# Patient Record
Sex: FEMALE | Race: OTHER | Employment: PART TIME | URBAN - METROPOLITAN AREA
[De-identification: names, ages, dates, MRNs, and addresses within clinical notes are randomized per-mention and may not be internally consistent; named-entity substitution may affect disease eponyms.]

---

## 2017-02-27 ENCOUNTER — HOSPITAL ENCOUNTER (OUTPATIENT)
Dept: MAMMOGRAPHY | Age: 42
Discharge: HOME | End: 2017-02-27
Attending: OBSTETRICS & GYNECOLOGY
Payer: MEDICAID

## 2017-02-27 DIAGNOSIS — Z12.31 VISIT FOR SCREENING MAMMOGRAM: ICD-10-CM

## 2017-02-27 PROCEDURE — 77067 SCR MAMMO BI INCL CAD: CPT

## 2017-10-04 ENCOUNTER — HOSPITAL ENCOUNTER (OUTPATIENT)
Age: 42
Discharge: HOME | End: 2017-10-04
Attending: EMERGENCY MEDICINE
Payer: MEDICAID

## 2017-10-04 VITALS
DIASTOLIC BLOOD PRESSURE: 82 MMHG | SYSTOLIC BLOOD PRESSURE: 113 MMHG | RESPIRATION RATE: 18 BRPM | BODY MASS INDEX: 24 KG/M2 | HEART RATE: 101 BPM | OXYGEN SATURATION: 100 % | WEIGHT: 134 LBS | TEMPERATURE: 98 F

## 2017-10-04 DIAGNOSIS — H65.92 LEFT OTITIS MEDIA WITH EFFUSION: Primary | ICD-10-CM

## 2017-10-04 PROCEDURE — 99213 OFFICE O/P EST LOW 20 MIN: CPT

## 2017-10-04 PROCEDURE — 87430 STREP A AG IA: CPT

## 2017-10-04 PROCEDURE — 99214 OFFICE O/P EST MOD 30 MIN: CPT

## 2017-10-04 RX ORDER — AMOXICILLIN 875 MG/1
875 TABLET, COATED ORAL 2 TIMES DAILY
Qty: 20 TABLET | Refills: 0 | Status: SHIPPED | OUTPATIENT
Start: 2017-10-04 | End: 2017-10-14

## 2017-10-04 NOTE — ED PROVIDER NOTES
Patient Seen in: [facility] AND CLINICS Immediate Care In [address] [place] [address]    History   Patient presents with:  Cough/URI    Stated Complaint: sorethroat/ear pain    HPI    Patient is a 15-year-old female with no significant past medical history presents now with the posterior pharyngeal erythema without exudate. The uvula is midline.   There is mild diffuse sinus tenderness to palpation bilaterally  Chest: Clear to auscultation, no tenderness  Cardiovascular: Regular rate and rhythm without murmur  Abdomen: Soft, nont

## 2017-12-24 ENCOUNTER — HOSPITAL ENCOUNTER (OUTPATIENT)
Age: 42
Discharge: HOME | End: 2017-12-24
Attending: EMERGENCY MEDICINE
Payer: MEDICAID

## 2017-12-24 VITALS
RESPIRATION RATE: 16 BRPM | DIASTOLIC BLOOD PRESSURE: 70 MMHG | OXYGEN SATURATION: 100 % | WEIGHT: 139 LBS | TEMPERATURE: 100 F | SYSTOLIC BLOOD PRESSURE: 119 MMHG | HEART RATE: 119 BPM | BODY MASS INDEX: 25 KG/M2

## 2017-12-24 DIAGNOSIS — J11.1 INFLUENZA-LIKE ILLNESS: Primary | ICD-10-CM

## 2017-12-24 PROCEDURE — 99213 OFFICE O/P EST LOW 20 MIN: CPT

## 2017-12-24 PROCEDURE — 87430 STREP A AG IA: CPT

## 2017-12-24 PROCEDURE — 99214 OFFICE O/P EST MOD 30 MIN: CPT

## 2017-12-24 RX ORDER — BENZONATATE 200 MG/1
200 CAPSULE ORAL 3 TIMES DAILY PRN
Qty: 15 CAPSULE | Refills: 0 | Status: SHIPPED | OUTPATIENT
Start: 2017-12-24 | End: 2017-12-29

## 2017-12-24 NOTE — ED PROVIDER NOTES
Patient Seen in: [facility] Immediate Care In [address] [place] [address]    History   Patient presents with:  Sore Throat  Cough/URI  Ear Problem Pain (neurosensory)    Stated Complaint: sore throat, cough    HPI  Patient is on day 3 of this illness.   It started wi and external ear normal.   Nose: Mucosal edema and rhinorrhea present. Mouth/Throat: Uvula is midline, oropharynx is clear and moist and mucous membranes are normal.   Eyes: Conjunctivae and EOM are normal. Pupils are equal, round, and reactive to light.

## 2017-12-24 NOTE — ED NOTES
Has he flu- increase po fluids rest wash hands cover mouth when coughing. Motrin or tylenol for fever aches and pains. Call and follow up with pcp in 3 days if not better. Go to the ed for new or worse concerns- high fever shortness of breath chest pains.

## 2018-08-27 ENCOUNTER — LAB ENCOUNTER (OUTPATIENT)
Dept: LAB | Age: 43
End: 2018-08-27
Attending: FAMILY MEDICINE
Payer: MEDICAID

## 2018-08-27 ENCOUNTER — OFFICE VISIT (OUTPATIENT)
Dept: FAMILY MEDICINE CLINIC | Facility: CLINIC | Age: 43
End: 2018-08-27
Payer: MEDICAID

## 2018-08-27 ENCOUNTER — APPOINTMENT (OUTPATIENT)
Dept: LAB | Age: 43
End: 2018-08-27
Attending: FAMILY MEDICINE
Payer: MEDICAID

## 2018-08-27 VITALS
SYSTOLIC BLOOD PRESSURE: 126 MMHG | DIASTOLIC BLOOD PRESSURE: 85 MMHG | WEIGHT: 131 LBS | TEMPERATURE: 98 F | HEART RATE: 92 BPM | BODY MASS INDEX: 23.5 KG/M2 | HEIGHT: 62.5 IN

## 2018-08-27 DIAGNOSIS — Z00.00 PHYSICAL EXAM: Primary | ICD-10-CM

## 2018-08-27 DIAGNOSIS — Z00.00 PHYSICAL EXAM: ICD-10-CM

## 2018-08-27 DIAGNOSIS — D22.9 NEVUS: ICD-10-CM

## 2018-08-27 LAB
ALBUMIN SERPL BCP-MCNC: 3.8 G/DL (ref 3.5–4.8)
ALBUMIN/GLOB SERPL: 0.9 {RATIO} (ref 1–2)
ALP SERPL-CCNC: 61 U/L (ref 32–100)
ALT SERPL-CCNC: 17 U/L (ref 14–54)
ANION GAP SERPL CALC-SCNC: 9 MMOL/L (ref 0–18)
AST SERPL-CCNC: 23 U/L (ref 15–41)
BILIRUB SERPL-MCNC: 0.5 MG/DL (ref 0.3–1.2)
BUN SERPL-MCNC: 7 MG/DL (ref 8–20)
BUN/CREAT SERPL: 12.7 (ref 10–20)
CALCIUM SERPL-MCNC: 8.7 MG/DL (ref 8.5–10.5)
CHLORIDE SERPL-SCNC: 107 MMOL/L (ref 95–110)
CO2 SERPL-SCNC: 23 MMOL/L (ref 22–32)
CREAT SERPL-MCNC: 0.55 MG/DL (ref 0.5–1.5)
GLOBULIN PLAS-MCNC: 4.2 G/DL (ref 2.5–3.7)
GLUCOSE SERPL-MCNC: 108 MG/DL (ref 70–99)
OSMOLALITY UR CALC.SUM OF ELEC: 287 MOSM/KG (ref 275–295)
PATIENT FASTING: YES
POTASSIUM SERPL-SCNC: 3.4 MMOL/L (ref 3.3–5.1)
PROT SERPL-MCNC: 8 G/DL (ref 5.9–8.4)
SODIUM SERPL-SCNC: 139 MMOL/L (ref 136–144)
TSH SERPL-ACNC: 2.34 UIU/ML (ref 0.45–5.33)

## 2018-08-27 PROCEDURE — 99396 PREV VISIT EST AGE 40-64: CPT | Performed by: FAMILY MEDICINE

## 2018-08-27 PROCEDURE — 36415 COLL VENOUS BLD VENIPUNCTURE: CPT

## 2018-08-27 PROCEDURE — 84443 ASSAY THYROID STIM HORMONE: CPT

## 2018-08-27 PROCEDURE — 80053 COMPREHEN METABOLIC PANEL: CPT

## 2018-08-27 PROCEDURE — 85025 COMPLETE CBC W/AUTO DIFF WBC: CPT

## 2018-08-27 PROCEDURE — 83036 HEMOGLOBIN GLYCOSYLATED A1C: CPT

## 2018-08-27 PROCEDURE — 90471 IMMUNIZATION ADMIN: CPT | Performed by: FAMILY MEDICINE

## 2018-08-27 PROCEDURE — 90715 TDAP VACCINE 7 YRS/> IM: CPT | Performed by: FAMILY MEDICINE

## 2018-08-27 PROCEDURE — 85060 BLOOD SMEAR INTERPRETATION: CPT

## 2018-08-27 PROCEDURE — 82306 VITAMIN D 25 HYDROXY: CPT | Performed by: FAMILY MEDICINE

## 2018-08-27 PROCEDURE — 93010 ELECTROCARDIOGRAM REPORT: CPT | Performed by: FAMILY MEDICINE

## 2018-08-27 PROCEDURE — 93005 ELECTROCARDIOGRAM TRACING: CPT

## 2018-08-27 NOTE — PROGRESS NOTES
8/27/2018  9:34 AM    Harjeet Schuster is a 37year old female. Chief complaint(s): Patient presents with:  Routine Physical    HPI:     Harjeet Schuster primary complaint is regarding CPE.      Harjeet Schuster is a 37year old female present today for Negative for ear pain, hearing loss, nosebleeds and tinnitus. Eyes: Negative for visual disturbance. Respiratory: Negative for apnea, cough, chest tightness, shortness of breath and wheezing.     Cardiovascular: Negative for chest pain, palpitations an exhibits no mass, no nipple discharge and no skin change. Lungs clear to auscultation bilaterally. Abdominal: Soft. Musculoskeletal:   Spine without scoliosis or kyphosis.   Range of motions of both upper and lower extremities are normal.   Lymphadeno responsibilities: Recommendations on physical activity; exercise daily or at least 3 times a week for 30-60 minutes doing cardiovascular exercise. Patient educated on self breast examination to be done on a monthly basis.   Consider a  if ov

## 2018-08-28 LAB
BASOPHILS # BLD: 0.1 K/UL (ref 0–0.2)
BASOPHILS NFR BLD: 1 %
EOSINOPHIL # BLD: 0.4 K/UL (ref 0–0.7)
EOSINOPHIL NFR BLD: 7 %
ERYTHROCYTE [DISTWIDTH] IN BLOOD BY AUTOMATED COUNT: 20.6 % (ref 11–15)
HBA1C MFR BLD: 5.7 % (ref 4–6)
HCT VFR BLD AUTO: 32.1 % (ref 35–48)
HGB BLD-MCNC: 10 G/DL (ref 12–16)
LYMPHOCYTES # BLD: 2 K/UL (ref 1–4)
LYMPHOCYTES NFR BLD: 39 %
MCH RBC QN AUTO: 19.6 PG (ref 27–32)
MCHC RBC AUTO-ENTMCNC: 31.2 G/DL (ref 32–37)
MCV RBC AUTO: 63 FL (ref 80–100)
MONOCYTES # BLD: 0.5 K/UL (ref 0–1)
MONOCYTES NFR BLD: 10 %
NEUTROPHILS # BLD AUTO: 2.3 K/UL (ref 1.8–7.7)
NEUTROPHILS NFR BLD: 43 %
PLATELET # BLD AUTO: 476 K/UL (ref 140–400)
PMV BLD AUTO: 9.3 FL (ref 7.4–10.3)
RBC # BLD AUTO: 5.1 M/UL (ref 3.7–5.4)
WBC # BLD AUTO: 5.3 K/UL (ref 4–11)

## 2018-08-29 LAB — 25(OH)D3 SERPL-MCNC: 23.7 NG/ML

## 2018-08-29 RX ORDER — ERGOCALCIFEROL 1.25 MG/1
50000 CAPSULE ORAL WEEKLY
Qty: 12 CAPSULE | Refills: 4 | Status: SHIPPED | OUTPATIENT
Start: 2018-08-29 | End: 2019-10-08

## 2018-09-01 ENCOUNTER — TELEPHONE (OUTPATIENT)
Dept: FAMILY MEDICINE CLINIC | Facility: CLINIC | Age: 43
End: 2018-09-01

## 2018-09-01 NOTE — TELEPHONE ENCOUNTER
----- Message from John Blunt MD sent at 8/29/2018  1:52 PM CDT -----  Please notify patient that his/her blood test showed low levels of vitamin D. A prescription was sent to his pharmacy to take one capsule or tablet, once a week.  This Rx is good

## 2018-09-06 NOTE — TELEPHONE ENCOUNTER
Spoke to patient informed of low vit D results, informed of Dr. Valdemar Dubois message patient verbalized understanding.

## 2018-09-11 ENCOUNTER — OFFICE VISIT (OUTPATIENT)
Dept: FAMILY MEDICINE CLINIC | Facility: CLINIC | Age: 43
End: 2018-09-11
Payer: MEDICAID

## 2018-09-11 VITALS
WEIGHT: 131 LBS | HEART RATE: 77 BPM | SYSTOLIC BLOOD PRESSURE: 118 MMHG | BODY MASS INDEX: 24 KG/M2 | DIASTOLIC BLOOD PRESSURE: 76 MMHG

## 2018-09-11 DIAGNOSIS — D22.9 NEVUS: Primary | ICD-10-CM

## 2018-09-11 PROCEDURE — 11441 EXC FACE-MM B9+MARG 0.6-1 CM: CPT | Performed by: FAMILY MEDICINE

## 2018-09-11 NOTE — PROGRESS NOTES
9/11/2018  3:28 PM    Boyd Portillo is a 37year old female.     Chief complaint(s): Patient presents with:  Procedure: Nevus removal    HPI:     Boyd Genaignacio primary complaint is regarding nevus removal.     Patient is a 58-year-old female who prese Constitutional: She appears well-developed and well-nourished.    /76 (BP Location: Right arm, Patient Position: Sitting, Cuff Size: adult)   Pulse 77   Wt 131 lb (59.4 kg)   LMP 08/26/2018 (Exact Date)   BMI 23.58 kg/m²    HENT:   Head: Normocephalic No results found for: Darlene Cardona   Results for orders placed or performed in visit on 47/76/17   COMP METABOLIC PANEL (14)   Result Value Ref Range    Glucose 108 (H) 70 - 99 mg/dL    Sodium 139 136 - 144 mmol/L Additional considerations for anemia of this type may include some hemoglobinopathies, sideroblastic anemias, and less often, anemia of chronic disease.       Other causes of thrombocytosis may include postsplenectomy (temporary), chronic inflammatory/infec Orders Placed This Encounter      Specimen to Pathology, Tissue [E]      Meds This Visit:  Requested Prescriptions      No prescriptions requested or ordered in this encounter       Imaging & Referrals:  None         Neri Dickson MD

## 2018-09-17 ENCOUNTER — HOSPITAL ENCOUNTER (OUTPATIENT)
Dept: MAMMOGRAPHY | Age: 43
Discharge: HOME | End: 2018-09-17
Attending: FAMILY MEDICINE
Payer: MEDICAID

## 2018-09-17 DIAGNOSIS — Z00.00 PHYSICAL EXAM: ICD-10-CM

## 2018-09-17 PROCEDURE — 77067 SCR MAMMO BI INCL CAD: CPT | Performed by: FAMILY MEDICINE

## 2018-09-18 ENCOUNTER — OFFICE VISIT (OUTPATIENT)
Dept: FAMILY MEDICINE CLINIC | Facility: CLINIC | Age: 43
End: 2018-09-18
Payer: MEDICAID

## 2018-09-18 VITALS
WEIGHT: 132 LBS | DIASTOLIC BLOOD PRESSURE: 80 MMHG | SYSTOLIC BLOOD PRESSURE: 122 MMHG | BODY MASS INDEX: 24 KG/M2 | HEART RATE: 77 BPM

## 2018-09-18 DIAGNOSIS — N92.0 MENORRHAGIA WITH REGULAR CYCLE: ICD-10-CM

## 2018-09-18 DIAGNOSIS — D50.0 IRON DEFICIENCY ANEMIA DUE TO CHRONIC BLOOD LOSS: Primary | ICD-10-CM

## 2018-09-18 PROCEDURE — 99212 OFFICE O/P EST SF 10 MIN: CPT | Performed by: FAMILY MEDICINE

## 2018-09-18 PROCEDURE — 99214 OFFICE O/P EST MOD 30 MIN: CPT | Performed by: FAMILY MEDICINE

## 2018-09-18 RX ORDER — FERROUS SULFATE TAB EC 324 MG (65 MG FE EQUIVALENT) 324 (65 FE) MG
1 TABLET DELAYED RESPONSE ORAL 3 TIMES DAILY
Qty: 90 TABLET | Refills: 1 | Status: SHIPPED | OUTPATIENT
Start: 2018-09-18 | End: 2019-02-02

## 2018-09-18 RX ORDER — FOLIC ACID 1 MG/1
1 TABLET ORAL DAILY
Qty: 90 TABLET | Refills: 0 | Status: SHIPPED | OUTPATIENT
Start: 2018-09-18 | End: 2019-02-05

## 2018-09-18 NOTE — PROGRESS NOTES
9/18/2018  2:56 PM    Reggie Levin is a 37year old female. Chief complaint(s): Patient presents with:  Procedure Follow Up    HPI:     Reggie Levin primary complaint is regarding anemia.      Reggie Levin present for evaluation for menorrhag tablet Rfl: 0   ergocalciferol 69502 units Oral Cap Take 1 capsule (50,000 Units total) by mouth once a week.  Disp: 12 capsule Rfl: 4       Allergies:  No Known Allergies      ROS:   Review of Systems   Constitutional: Negative for chills, fatigue and feve Case: [id number]                                  Authorizing Provider:  Nata Zimmerman MD      Collected:           09/11/2018 04:10 PM          Ordering Location:     [facility]      Received:            09/11/2018 04: BREAST COMPOSITION:  CATEGORY c - The breasts are heterogeneously dense, which may obscure small masses. FINDINGS:    RIGHT BREAST:  No significant suspicious finding. No significant change has occurred.    LEFT BREAST:  No significant suspicious findi the [facility] if there is a deterioration or worsening of the medical condition. Also, inform the doctor with any new symptoms or medications' side effects. Iron rich diet. FOLLOW-UP: Schedule a follow-up visit in 4 weeks.        Orders This Visit

## 2018-09-19 ENCOUNTER — APPOINTMENT (OUTPATIENT)
Dept: LAB | Age: 43
End: 2018-09-19
Attending: FAMILY MEDICINE
Payer: MEDICAID

## 2018-09-19 LAB — FERRITIN SERPL IA-MCNC: 4 NG/ML (ref 11–307)

## 2018-09-19 PROCEDURE — 36415 COLL VENOUS BLD VENIPUNCTURE: CPT | Performed by: FAMILY MEDICINE

## 2018-09-19 PROCEDURE — 82728 ASSAY OF FERRITIN: CPT | Performed by: FAMILY MEDICINE

## 2018-10-03 ENCOUNTER — TELEPHONE (OUTPATIENT)
Dept: FAMILY MEDICINE CLINIC | Facility: CLINIC | Age: 43
End: 2018-10-03

## 2018-10-03 NOTE — TELEPHONE ENCOUNTER
[facility] ci regarding Authorization that needs to be done for [address] ([address] [place] [address])  (CPT=76856) (Caden Molina) [[id number]] (Order [id number]).      Patient is scheduled for Tomorrow 10/04/2018 at 10 am

## 2018-10-08 ENCOUNTER — HOSPITAL ENCOUNTER (OUTPATIENT)
Dept: ULTRASOUND IMAGING | Age: 43
Discharge: HOME | End: 2018-10-08
Attending: FAMILY MEDICINE
Payer: MEDICAID

## 2018-10-08 DIAGNOSIS — N92.0 MENORRHAGIA WITH REGULAR CYCLE: ICD-10-CM

## 2018-10-08 PROCEDURE — 76856 US EXAM PELVIC COMPLETE: CPT | Performed by: FAMILY MEDICINE

## 2018-10-08 PROCEDURE — 76830 TRANSVAGINAL US NON-OB: CPT | Performed by: FAMILY MEDICINE

## 2018-10-08 NOTE — TELEPHONE ENCOUNTER
Prior Authorization Confirmed/Denied/NA 10/05/2018  2:26 PM Freda Sheikh - -   Note    Status: Authorized  Status Check Method:   Contact Name, Number:   Approved CPTs:

## 2018-10-23 ENCOUNTER — OFFICE VISIT (OUTPATIENT)
Dept: FAMILY MEDICINE CLINIC | Facility: CLINIC | Age: 43
End: 2018-10-23
Payer: MEDICAID

## 2018-10-23 VITALS
SYSTOLIC BLOOD PRESSURE: 118 MMHG | DIASTOLIC BLOOD PRESSURE: 81 MMHG | HEART RATE: 96 BPM | TEMPERATURE: 98 F | BODY MASS INDEX: 24 KG/M2 | WEIGHT: 134 LBS

## 2018-10-23 DIAGNOSIS — N92.0 MENORRHAGIA WITH REGULAR CYCLE: Primary | ICD-10-CM

## 2018-10-23 DIAGNOSIS — D25.2 INTRAMURAL AND SUBSEROUS LEIOMYOMA OF UTERUS: ICD-10-CM

## 2018-10-23 DIAGNOSIS — D25.1 INTRAMURAL AND SUBSEROUS LEIOMYOMA OF UTERUS: ICD-10-CM

## 2018-10-23 DIAGNOSIS — D50.0 IRON DEFICIENCY ANEMIA DUE TO CHRONIC BLOOD LOSS: ICD-10-CM

## 2018-10-23 PROCEDURE — 85018 HEMOGLOBIN: CPT | Performed by: FAMILY MEDICINE

## 2018-10-23 PROCEDURE — 99214 OFFICE O/P EST MOD 30 MIN: CPT | Performed by: FAMILY MEDICINE

## 2018-10-23 PROCEDURE — 36416 COLLJ CAPILLARY BLOOD SPEC: CPT | Performed by: FAMILY MEDICINE

## 2018-10-23 PROCEDURE — 99212 OFFICE O/P EST SF 10 MIN: CPT | Performed by: FAMILY MEDICINE

## 2018-10-23 PROCEDURE — 90471 IMMUNIZATION ADMIN: CPT | Performed by: FAMILY MEDICINE

## 2018-10-23 PROCEDURE — 90686 IIV4 VACC NO PRSV 0.5 ML IM: CPT | Performed by: FAMILY MEDICINE

## 2018-10-23 NOTE — PROGRESS NOTES
10/23/2018  10:07 AM    Ann Stone is a 37year old female. Chief complaint(s): Patient presents with: Follow - Up  Test Results: u/s results    HPI:     Ann Stone primary complaint is regarding menorrhagea.      Ann Stone present fo visit):    Current Outpatient Medications:  folic acid 1 MG Oral Tab Take 1 tablet (1 mg total) by mouth daily.  Disp: 90 tablet Rfl: 0       Allergies:  No Known Allergies      ROS:   Review of Systems   Constitutional: Negative for chills, fatigue and fev JUJU, 12/12/2011,  INDICATIONS: N92.0 Excessive and frequent menstruation with regular cycle  TECHNIQUE: Pelvic ultrasound using transabdominal and transvaginal technique.   A transvaginal scan was performed for a knee joint and adnexal evaluation  FIND Notify Dr Bijan Sanchez or the [facility] if there is a deterioration or worsening of the medical condition. Also, inform the doctor with any new symptoms or medications' side effects. Iron rich diet. FOLLOW-UP: Schedule a follow-up visit in 6 months.

## 2018-12-14 ENCOUNTER — OFFICE VISIT (OUTPATIENT)
Dept: FAMILY MEDICINE CLINIC | Facility: CLINIC | Age: 43
End: 2018-12-14
Payer: MEDICAID

## 2018-12-14 VITALS
HEART RATE: 91 BPM | SYSTOLIC BLOOD PRESSURE: 129 MMHG | BODY MASS INDEX: 22.36 KG/M2 | HEIGHT: 64 IN | TEMPERATURE: 99 F | WEIGHT: 131 LBS | DIASTOLIC BLOOD PRESSURE: 79 MMHG

## 2018-12-14 DIAGNOSIS — J03.90 TONSILLITIS: Primary | ICD-10-CM

## 2018-12-14 PROCEDURE — 99213 OFFICE O/P EST LOW 20 MIN: CPT | Performed by: NURSE PRACTITIONER

## 2018-12-14 RX ORDER — FERROUS SULFATE TAB EC 324 MG (65 MG FE EQUIVALENT) 324 (65 FE) MG
TABLET DELAYED RESPONSE ORAL
Refills: 1 | COMMUNITY
Start: 2018-11-13 | End: 2019-02-05

## 2018-12-14 RX ORDER — AMOXICILLIN AND CLAVULANATE POTASSIUM 875; 125 MG/1; MG/1
1 TABLET, FILM COATED ORAL 2 TIMES DAILY
Qty: 14 TABLET | Refills: 0 | Status: SHIPPED | OUTPATIENT
Start: 2018-12-14 | End: 2018-12-21

## 2018-12-14 RX ORDER — ERGOCALCIFEROL 1.25 MG/1
CAPSULE ORAL
Refills: 4 | COMMUNITY
Start: 2018-11-13 | End: 2019-11-07

## 2018-12-14 NOTE — PROGRESS NOTES
HPI  Pt here for pain and pressure in ears; has sore throat. S/s started yesterday and started worsening last night. Took ibuprofen. Denies fever. Having difficulty swallowing.      Review of Systems   Constitutional: Positive for activity change, appetite Substance and Sexual Activity      Alcohol use: No      Drug use: No      Sexual activity: Not on file    Other Topics      Concerns:         Service: Not Asked        Blood Transfusions: Not Asked        Caffeine Concern: Yes          per NG: cof present. Pulmonary/Chest: Effort normal and breath sounds normal. No respiratory distress. She has no wheezes. She has no rales. She exhibits no tenderness. Abdominal: Soft. Bowel sounds are normal. She exhibits no distension. There is no tenderness.

## 2018-12-14 NOTE — ASSESSMENT & PLAN NOTE
Start augmentin 875 mg I po bid x 7 days  Supportive care discussed      Please call if symptoms worsen or are not resolving.

## 2019-02-02 DIAGNOSIS — J03.90 TONSILLITIS: ICD-10-CM

## 2019-02-04 RX ORDER — AMOXICILLIN AND CLAVULANATE POTASSIUM 875; 125 MG/1; MG/1
TABLET, FILM COATED ORAL
Qty: 14 TABLET | Refills: 0 | OUTPATIENT
Start: 2019-02-04

## 2019-02-04 RX ORDER — FOLIC ACID 1 MG/1
1 TABLET ORAL DAILY
Qty: 90 TABLET | Refills: 0 | Status: CANCELLED | OUTPATIENT
Start: 2019-02-04

## 2019-02-04 RX ORDER — FERROUS SULFATE TAB EC 324 MG (65 MG FE EQUIVALENT) 324 (65 FE) MG
TABLET DELAYED RESPONSE ORAL
Qty: 90 TABLET | Refills: 0 | Status: SHIPPED | OUTPATIENT
Start: 2019-02-04 | End: 2019-04-23

## 2019-02-04 NOTE — TELEPHONE ENCOUNTER
Patient is requesting refill for Amoxicillin. . See msg below from TE refill 02/02/19    RN pls call pt and verify the need of rx refill, pls triage or offer ov if needed, thanks.         No Protocol on this med.      Requested Prescriptions             Pen

## 2019-02-04 NOTE — TELEPHONE ENCOUNTER
No Protocol on this med.      Requested Prescriptions     Pending Prescriptions Disp Refills   • FERROUS SULFATE 324 (65 Fe) MG Oral Tab EC [Pharmacy Med Name: FERROUS SULFATE 324MG ECTABS RED] 90 tablet 0     Sig: TAKE 1 TABLET BY MOUTH THREE TIMES DAILY

## 2019-02-04 NOTE — TELEPHONE ENCOUNTER
Patient states she was using antibiotic 3 weeks ago but doesn't need refill for antibiotic. She only needs refill for Iron and folic acid. Iron already sent today. Folic acid script pended.  Patient states she also has OV with Dr. Jaime Martínez for tomorrow and

## 2019-02-04 NOTE — TELEPHONE ENCOUNTER
Please review, no  protocol for antibiotic request.     Pt has appt with Dr Gail Maldonado 2/5/19 at 2:00pm ADO. Tasked to Dr Gail Maldonado as Ryan Hernandez.

## 2019-02-04 NOTE — TELEPHONE ENCOUNTER
RN pls call pt and verify the need of rx refill, pls triage or offer ov if needed, thanks. No Protocol on this med.      Requested Prescriptions     Pending Prescriptions Disp Refills   • AMOXICILLIN-POT CLAVULANATE 875-125 MG Oral Tab [Pharmacy Med N

## 2019-02-05 ENCOUNTER — HOSPITAL ENCOUNTER (OUTPATIENT)
Dept: GENERAL RADIOLOGY | Age: 44
Discharge: HOME | End: 2019-02-05
Attending: FAMILY MEDICINE
Payer: MEDICAID

## 2019-02-05 ENCOUNTER — OFFICE VISIT (OUTPATIENT)
Dept: FAMILY MEDICINE CLINIC | Facility: CLINIC | Age: 44
End: 2019-02-05
Payer: MEDICAID

## 2019-02-05 ENCOUNTER — TELEPHONE (OUTPATIENT)
Dept: FAMILY MEDICINE CLINIC | Facility: CLINIC | Age: 44
End: 2019-02-05

## 2019-02-05 VITALS
TEMPERATURE: 99 F | DIASTOLIC BLOOD PRESSURE: 78 MMHG | BODY MASS INDEX: 22.2 KG/M2 | RESPIRATION RATE: 16 BRPM | SYSTOLIC BLOOD PRESSURE: 116 MMHG | HEIGHT: 64 IN | WEIGHT: 130 LBS | HEART RATE: 87 BPM

## 2019-02-05 DIAGNOSIS — S69.92XA INJURY OF LEFT WRIST, INITIAL ENCOUNTER: ICD-10-CM

## 2019-02-05 DIAGNOSIS — S66.912A STRAIN OF LEFT WRIST, INITIAL ENCOUNTER: ICD-10-CM

## 2019-02-05 DIAGNOSIS — S69.92XA INJURY OF LEFT WRIST, INITIAL ENCOUNTER: Primary | ICD-10-CM

## 2019-02-05 PROCEDURE — 99214 OFFICE O/P EST MOD 30 MIN: CPT | Performed by: FAMILY MEDICINE

## 2019-02-05 PROCEDURE — L3908 WHO COCK-UP NONMOLDE PRE OTS: HCPCS | Performed by: FAMILY MEDICINE

## 2019-02-05 PROCEDURE — 73090 X-RAY EXAM OF FOREARM: CPT | Performed by: FAMILY MEDICINE

## 2019-02-05 PROCEDURE — 99212 OFFICE O/P EST SF 10 MIN: CPT | Performed by: FAMILY MEDICINE

## 2019-02-05 RX ORDER — NAPROXEN 500 MG/1
500 TABLET ORAL 2 TIMES DAILY WITH MEALS
Qty: 40 TABLET | Refills: 0 | Status: SHIPPED | OUTPATIENT
Start: 2019-02-05 | End: 2019-07-30

## 2019-02-05 RX ORDER — FOLIC ACID 1 MG/1
1 TABLET ORAL DAILY
Qty: 90 TABLET | Refills: 0 | Status: SHIPPED | OUTPATIENT
Start: 2019-02-05 | End: 2019-04-23

## 2019-02-05 NOTE — PROGRESS NOTES
2/5/2019  2:37 PM    More Hassan is a 37year old female.     Chief complaint(s): Patient presents with:  Pain: Patient present with swollen left wrist and fingers - x 1 week - also painful -taking Ibuprofen    HPI:     More Hassan primary complai Constitutional: Negative for chills, fatigue and fever. HENT: Negative for ear pain and sore throat. Respiratory: Negative for cough and wheezing. Cardiovascular: Negative for chest pain and palpitations.    Gastrointestinal: Negative for nausea, Prescriptions Disp Refills   • naproxen 500 MG Oral Tab 40 tablet 0     Sig: Take 1 tablet (500 mg total) by mouth 2 (two) times daily with meals. • folic acid 1 MG Oral Tab 90 tablet 0     Sig: Take 1 tablet (1 mg total) by mouth daily.      REFERRALS: P

## 2019-02-11 ENCOUNTER — TELEPHONE (OUTPATIENT)
Dept: FAMILY MEDICINE CLINIC | Facility: CLINIC | Age: 44
End: 2019-02-11

## 2019-02-11 DIAGNOSIS — S69.90XA INJURY OF WRIST, UNSPECIFIED LATERALITY, INITIAL ENCOUNTER: Primary | ICD-10-CM

## 2019-02-18 ENCOUNTER — OFFICE VISIT (OUTPATIENT)
Dept: PHYSICAL THERAPY | Age: 44
End: 2019-02-18
Attending: FAMILY MEDICINE
Payer: MEDICAID

## 2019-02-18 DIAGNOSIS — S69.90XA INJURY OF WRIST, UNSPECIFIED LATERALITY, INITIAL ENCOUNTER: ICD-10-CM

## 2019-02-18 PROCEDURE — 97110 THERAPEUTIC EXERCISES: CPT | Performed by: OCCUPATIONAL THERAPIST

## 2019-02-18 PROCEDURE — 97165 OT EVAL LOW COMPLEX 30 MIN: CPT | Performed by: OCCUPATIONAL THERAPIST

## 2019-02-19 NOTE — PROGRESS NOTES
OCCUPATIONAL THERAPY UPPER EXTREMITY EVALUATION   Referring Physician: Dr. Aleksandra Kennedy  Diagnosis: Injury of wrist, unspecified laterality, initial encounter (C53.13NU)      Date of onset: 2/4/19 Date of Service: 2/18/2019       PATIENT SUMMARY   Cornelia Floyd agreement with FOTO score and clinical rationale along with co-mobidities and  past medical hisotry patient presents with low complexity.    Also involved an expanded review of the occupational profille, medical, social and therapy history as well as prior PLAN OF CARE   Goals:    1. Pt will be independent and compliant with comprehensive HEP to maximize progress achieved in OT. 2. Pt complaints of pain will decrease at worst to 0-1/10 in order to return to ADL's with greater ease.   3. Wrist extension a

## 2019-02-20 ENCOUNTER — OFFICE VISIT (OUTPATIENT)
Dept: PHYSICAL THERAPY | Age: 44
End: 2019-02-20
Attending: FAMILY MEDICINE
Payer: MEDICAID

## 2019-02-20 PROCEDURE — 97110 THERAPEUTIC EXERCISES: CPT | Performed by: OCCUPATIONAL THERAPIST

## 2019-02-20 PROCEDURE — 97140 MANUAL THERAPY 1/> REGIONS: CPT | Performed by: OCCUPATIONAL THERAPIST

## 2019-02-20 NOTE — PROGRESS NOTES
Dx: Injury of wrist, unspecified laterality, initial encounter (W35.99QV)          Authorized # of Visits/Insurance:  [facility] family- 8 visits         Next MD visit: none scheduled  Fall Risk: standard         Precautions: n/a           Medication Changes sin OT.  2. Pt complaints of pain will decrease at worst to 0-1/10 in order to return to ADL's with greater ease. 3. Wrist extension and flexion will each increase to 60 degrees  in order to push up from a chair with greater ease.   4.  strength will incre

## 2019-02-25 ENCOUNTER — OFFICE VISIT (OUTPATIENT)
Dept: PHYSICAL THERAPY | Age: 44
End: 2019-02-25
Attending: FAMILY MEDICINE
Payer: MEDICAID

## 2019-02-25 PROCEDURE — 97110 THERAPEUTIC EXERCISES: CPT | Performed by: OCCUPATIONAL THERAPIST

## 2019-02-25 PROCEDURE — 97140 MANUAL THERAPY 1/> REGIONS: CPT | Performed by: OCCUPATIONAL THERAPIST

## 2019-02-26 NOTE — PROGRESS NOTES
Dx: Injury of wrist, unspecified laterality, initial encounter (A59.02JJ)          Authorized # of Visits/Insurance:  [employer] family- 8 visits         Next MD visit: none scheduled  Fall Risk: standard         Precautions: n/a           Medication Changes sin Goals      1. Pt will be independent and compliant with comprehensive HEP to maximize progress achieved in OT. 2. Pt complaints of pain will decrease at worst to 0-1/10 in order to return to ADL's with greater ease.   3. Wrist extension and flexion will e

## 2019-02-27 ENCOUNTER — OFFICE VISIT (OUTPATIENT)
Dept: PHYSICAL THERAPY | Age: 44
End: 2019-02-27
Attending: FAMILY MEDICINE
Payer: MEDICAID

## 2019-02-27 PROCEDURE — 97140 MANUAL THERAPY 1/> REGIONS: CPT | Performed by: OCCUPATIONAL THERAPIST

## 2019-02-27 PROCEDURE — 97110 THERAPEUTIC EXERCISES: CPT | Performed by: OCCUPATIONAL THERAPIST

## 2019-02-27 NOTE — PROGRESS NOTES
Dx: Injury of wrist, unspecified laterality, initial encounter (Y73.07BN)          Authorized # of Visits/Insurance:  [facility] family- 8 visits         Next MD visit: none scheduled  Fall Risk: standard         Precautions: n/a           Medication Changes sin at the radial wrist however this also remains generalized without pin point tenderness in any specific spot. Plan: Continue therapy 2x/wk to increase function for ADL's.     Charges: 1 MT, 2 TE       Total Timed Treatment: 45 min  Total Treatment Time:

## 2019-03-04 ENCOUNTER — OFFICE VISIT (OUTPATIENT)
Dept: PHYSICAL THERAPY | Age: 44
End: 2019-03-04
Attending: FAMILY MEDICINE
Payer: MEDICAID

## 2019-03-04 PROCEDURE — 97140 MANUAL THERAPY 1/> REGIONS: CPT | Performed by: OCCUPATIONAL THERAPIST

## 2019-03-04 PROCEDURE — 97110 THERAPEUTIC EXERCISES: CPT | Performed by: OCCUPATIONAL THERAPIST

## 2019-03-05 ENCOUNTER — OFFICE VISIT (OUTPATIENT)
Dept: FAMILY MEDICINE CLINIC | Facility: CLINIC | Age: 44
End: 2019-03-05
Payer: MEDICAID

## 2019-03-05 VITALS
HEIGHT: 64 IN | TEMPERATURE: 98 F | BODY MASS INDEX: 23.56 KG/M2 | HEART RATE: 90 BPM | WEIGHT: 138 LBS | SYSTOLIC BLOOD PRESSURE: 127 MMHG | DIASTOLIC BLOOD PRESSURE: 79 MMHG

## 2019-03-05 DIAGNOSIS — S69.92XA INJURY OF LEFT WRIST, INITIAL ENCOUNTER: Primary | ICD-10-CM

## 2019-03-05 DIAGNOSIS — D50.0 IRON DEFICIENCY ANEMIA DUE TO CHRONIC BLOOD LOSS: ICD-10-CM

## 2019-03-05 PROCEDURE — 99212 OFFICE O/P EST SF 10 MIN: CPT | Performed by: FAMILY MEDICINE

## 2019-03-05 PROCEDURE — 99214 OFFICE O/P EST MOD 30 MIN: CPT | Performed by: FAMILY MEDICINE

## 2019-03-05 NOTE — PROGRESS NOTES
3/5/2019  5:02 PM    Zulma Escobedo is a 37year old female. Chief complaint(s): Patient presents with:  Wrist Pain: left wrist injury    HPI:     Zulma Escobedo primary complaint is regarding wrist pain.      Patient is a 38-year-old female who sust ergocalciferol 69878 units Oral Cap TK 1 C PO 1 TIME A WK Disp:  Rfl: 4   folic acid 1 MG Oral Tab Take 1 tablet (1 mg total) by mouth daily.  Disp: 90 tablet Rfl: 0       Allergies:  No Known Allergies      ROS:   Review of Systems   Constitutional: Bekah Reas VIEWS), LEFT (CPT=73090)  COMPARISON: None. INDICATIONS: Left forearm pain post injury 4 days ago. TECHNIQUE:  AP and lateral views were obtained. FINDINGS:  BONES: No acute fracture dislocation. SOFT TISSUES: Negative.   No visible soft tissue swelling Referrals:  None         Crow Hensley MD

## 2019-03-05 NOTE — PROGRESS NOTES
Dx: Injury of wrist, unspecified laterality, initial encounter (V59.16PJ)          Authorized # of Visits/Insurance:  [employer] family- 8 visits         Next MD visit: none scheduled  Fall Risk: standard         Precautions: n/a           Medication Changes sin therapy but she does feel discomfort with end range wrist flexion but this is felt as a stretching pain. Assessment: Soft tissue tightness felt at the dorsal aspect of the wrist so therefore focus on manual work was in this area.   Patient is very motiv

## 2019-03-06 ENCOUNTER — OFFICE VISIT (OUTPATIENT)
Dept: PHYSICAL THERAPY | Age: 44
End: 2019-03-06
Attending: FAMILY MEDICINE
Payer: MEDICAID

## 2019-03-06 PROCEDURE — 97110 THERAPEUTIC EXERCISES: CPT | Performed by: OCCUPATIONAL THERAPIST

## 2019-03-06 PROCEDURE — 97140 MANUAL THERAPY 1/> REGIONS: CPT | Performed by: OCCUPATIONAL THERAPIST

## 2019-03-06 NOTE — PROGRESS NOTES
Dx: Injury of wrist, unspecified laterality, initial encounter (C72.27NM)          Authorized # of Visits/Insurance:  [facility] family- 8 visits         Next MD visit: none scheduled  Fall Risk: standard         Precautions: n/a           Medication Changes sin continues to remain noted throughout the wrist but DRUJ stability and no pain reported with testing. Will continue to monitor swelling as patient progresses.   Initiated rebounder exercise in order to challenge her strength dynamically which she tolerated

## 2019-03-11 ENCOUNTER — OFFICE VISIT (OUTPATIENT)
Dept: PHYSICAL THERAPY | Age: 44
End: 2019-03-11
Attending: FAMILY MEDICINE
Payer: MEDICAID

## 2019-03-11 PROCEDURE — 97110 THERAPEUTIC EXERCISES: CPT | Performed by: OCCUPATIONAL THERAPIST

## 2019-03-11 NOTE — PROGRESS NOTES
Dx: Injury of wrist, unspecified laterality, initial encounter (C00.52AP)          Authorized # of Visits/Insurance:  [facility] family- 8 visits         Next MD visit: none scheduled  Fall Risk: standard         Precautions: n/a           Medication Changes sin Re-education                                    Modalities           Fluidotherapy 110 degrees  5' L hand 5' L hand 5' L hand 5' L hand 5' L hand 5' L hand                   Assessment: Patient able to tolerate increases made to BTE program and rebounder e

## 2019-03-13 ENCOUNTER — LAB ENCOUNTER (OUTPATIENT)
Dept: LAB | Age: 44
End: 2019-03-13
Attending: FAMILY MEDICINE
Payer: MEDICAID

## 2019-03-13 ENCOUNTER — OFFICE VISIT (OUTPATIENT)
Dept: PHYSICAL THERAPY | Age: 44
End: 2019-03-13
Attending: FAMILY MEDICINE
Payer: MEDICAID

## 2019-03-13 DIAGNOSIS — S69.92XA INJURY OF LEFT WRIST, INITIAL ENCOUNTER: ICD-10-CM

## 2019-03-13 LAB
BASOPHILS # BLD AUTO: 0.07 X10(3) UL (ref 0–0.2)
BASOPHILS NFR BLD AUTO: 1.1 %
DEPRECATED RDW RBC AUTO: 47.7 FL (ref 35.1–46.3)
EOSINOPHIL # BLD AUTO: 0.48 X10(3) UL (ref 0–0.7)
EOSINOPHIL NFR BLD AUTO: 7.8 %
ERYTHROCYTE [DISTWIDTH] IN BLOOD BY AUTOMATED COUNT: 19.8 % (ref 11–15)
HCT VFR BLD AUTO: 30.8 % (ref 35–48)
HGB BLD-MCNC: 9.4 G/DL (ref 12–16)
IMM GRANULOCYTES # BLD AUTO: 0.01 X10(3) UL (ref 0–1)
IMM GRANULOCYTES NFR BLD: 0.2 %
LYMPHOCYTES # BLD AUTO: 2.3 X10(3) UL (ref 1–4)
LYMPHOCYTES NFR BLD AUTO: 37.3 %
MCH RBC QN AUTO: 21 PG (ref 26–34)
MCHC RBC AUTO-ENTMCNC: 30.5 G/DL (ref 31–37)
MCV RBC AUTO: 68.8 FL (ref 80–100)
MONOCYTES # BLD AUTO: 0.56 X10(3) UL (ref 0.1–1)
MONOCYTES NFR BLD AUTO: 9.1 %
NEUTROPHILS # BLD AUTO: 2.74 X10 (3) UL (ref 1.5–7.7)
NEUTROPHILS # BLD AUTO: 2.74 X10(3) UL (ref 1.5–7.7)
NEUTROPHILS NFR BLD AUTO: 44.5 %
PLATELET # BLD AUTO: 453 10(3)UL (ref 150–450)
PLATELET MORPHOLOGY: NORMAL
RBC # BLD AUTO: 4.48 X10(6)UL (ref 3.8–5.3)
WBC # BLD AUTO: 6.2 X10(3) UL (ref 4–11)

## 2019-03-13 PROCEDURE — 85060 BLOOD SMEAR INTERPRETATION: CPT

## 2019-03-13 PROCEDURE — 97110 THERAPEUTIC EXERCISES: CPT | Performed by: OCCUPATIONAL THERAPIST

## 2019-03-13 PROCEDURE — 97140 MANUAL THERAPY 1/> REGIONS: CPT | Performed by: OCCUPATIONAL THERAPIST

## 2019-03-13 PROCEDURE — 36415 COLL VENOUS BLD VENIPUNCTURE: CPT

## 2019-03-13 PROCEDURE — 85025 COMPLETE CBC W/AUTO DIFF WBC: CPT

## 2019-03-13 NOTE — PROGRESS NOTES
Patient Name: Courtney Quinn  YOB: 1975          MRN number:  [id number]  Date:  3/13/2019  Referring Physician:  Subhash Mcgraw    Progress Summary    Pt has attended 8,visits for Occupational Therapy    Assessment: Patient overall has drag x5   Web press  10\"hx5 10\"hx5 Orange putty press 10\"hx5 Red web press 10\"hx5 Green putty press 10\"hx5    Green putty with marbles x3 Body blade  2'x1    Gripper with pegs   15# x1 25# x1 25# x1 25# x1 25# x1 35#/25# x1 35#/25# x1   Clothes pins w to maximize progress achieved in OT. -met  2. Pt complaints of pain will decrease at worst to 0-1/10 in order to return to ADL's with greater ease. -not met  3.  Wrist extension and flexion will each increase to 60 degrees  in order to push up from a chair

## 2019-04-23 ENCOUNTER — OFFICE VISIT (OUTPATIENT)
Dept: FAMILY MEDICINE CLINIC | Facility: CLINIC | Age: 44
End: 2019-04-23
Payer: MEDICAID

## 2019-04-23 VITALS
BODY MASS INDEX: 23.97 KG/M2 | WEIGHT: 140.38 LBS | DIASTOLIC BLOOD PRESSURE: 76 MMHG | SYSTOLIC BLOOD PRESSURE: 125 MMHG | TEMPERATURE: 98 F | HEART RATE: 82 BPM | HEIGHT: 64 IN

## 2019-04-23 DIAGNOSIS — D25.1 INTRAMURAL AND SUBMUCOUS LEIOMYOMA OF UTERUS: ICD-10-CM

## 2019-04-23 DIAGNOSIS — D25.0 INTRAMURAL AND SUBMUCOUS LEIOMYOMA OF UTERUS: ICD-10-CM

## 2019-04-23 DIAGNOSIS — D50.0 IRON DEFICIENCY ANEMIA DUE TO CHRONIC BLOOD LOSS: Primary | ICD-10-CM

## 2019-04-23 DIAGNOSIS — N92.0 MENORRHAGIA WITH REGULAR CYCLE: ICD-10-CM

## 2019-04-23 PROCEDURE — 99213 OFFICE O/P EST LOW 20 MIN: CPT | Performed by: FAMILY MEDICINE

## 2019-04-23 PROCEDURE — 99212 OFFICE O/P EST SF 10 MIN: CPT | Performed by: FAMILY MEDICINE

## 2019-04-23 RX ORDER — FOLIC ACID 1 MG/1
1 TABLET ORAL DAILY
Qty: 90 TABLET | Refills: 0 | Status: SHIPPED | OUTPATIENT
Start: 2019-04-23 | End: 2019-07-30

## 2019-04-23 RX ORDER — FERROUS SULFATE TAB EC 324 MG (65 MG FE EQUIVALENT) 324 (65 FE) MG
1 TABLET DELAYED RESPONSE ORAL 3 TIMES DAILY
Qty: 90 TABLET | Refills: 0 | Status: SHIPPED | OUTPATIENT
Start: 2019-04-23 | End: 2019-07-14

## 2019-04-23 NOTE — PROGRESS NOTES
4/23/2019  11:03 AM    Darryl Javed is a 37year old female. Chief complaint(s): Patient presents with:  Anemia: low iron lab results done on 3/13/19 f/u    HPI:     Darryl Javed primary complaint is regarding as above.      Darryl Javed pres (1 mg total) by mouth daily. Disp: 90 tablet Rfl: 0   Ferrous Sulfate 324 (65 Fe) MG Oral Tab EC Take 1 tablet by mouth 3 (three) times daily.  Disp: 90 tablet Rfl: 0   ergocalciferol 72629 units Oral Cap TK 1 C PO 1 TIME A WK Disp:  Rfl: 4   naproxen 500 M Normal Normal    Microcytosis 1+      Polychromasia 1+      Ovalocytes 1+     CBC W/ DIFFERENTIAL   Result Value Ref Range    WBC 6.2 4.0 - 11.0 x10(3) uL    RBC 4.48 3.80 - 5.30 x10(6)uL    HGB 9.4 (L) 12.0 - 16.0 g/dL    HCT 30.8 (L) 35.0 - 48.0 %    MCV symptoms or medications' side effects. FOLLOW-UP: Schedule a follow-up visit in 3 months / prn.            Orders This Visit:  Orders Placed This Encounter      POC Hemoglobin [[id number]]      Meds This Visit:  Requested Prescriptions     Signed Prescripti

## 2019-05-02 ENCOUNTER — OFFICE VISIT (OUTPATIENT)
Dept: OBGYN CLINIC | Facility: CLINIC | Age: 44
End: 2019-05-02
Payer: MEDICAID

## 2019-05-02 VITALS
SYSTOLIC BLOOD PRESSURE: 134 MMHG | BODY MASS INDEX: 24 KG/M2 | HEART RATE: 80 BPM | DIASTOLIC BLOOD PRESSURE: 80 MMHG | WEIGHT: 139 LBS

## 2019-05-02 DIAGNOSIS — Z12.4 SCREENING FOR CERVICAL CANCER: ICD-10-CM

## 2019-05-02 DIAGNOSIS — D25.0 SUBMUCOUS LEIOMYOMA OF UTERUS: ICD-10-CM

## 2019-05-02 DIAGNOSIS — N92.0 MENORRHAGIA WITH REGULAR CYCLE: Primary | ICD-10-CM

## 2019-05-02 DIAGNOSIS — D50.0 IRON DEFICIENCY ANEMIA DUE TO CHRONIC BLOOD LOSS: ICD-10-CM

## 2019-05-02 PROCEDURE — 99203 OFFICE O/P NEW LOW 30 MIN: CPT | Performed by: OBSTETRICS & GYNECOLOGY

## 2019-05-03 NOTE — PROGRESS NOTES
[facility], [facility]  Obstetrics and Gynecology  Focused Gynecology Problem Exam  Keila Luo MD    Jong Edwards is a 37year old female presenting for Consult (Fibroids, Pelvic ultrasound was done on 10/08/2018.  Pt was referred by Dr. Todd Akhtar) ovarian cystectomy       Family History   Problem Relation Age of Onset   • Pulmonary Disease Father    • Stroke Father        Social History    Socioeconomic History      Marital status:       Spouse name: Not on file      Number of children: Not o Self-Exams: Not Asked    Social History Narrative      Not on file      Pelvic US:   FINDINGS:           UTERUS:           Enlarged lobular fibroid uterus measures 13.3 x 6.2 x 9.3 cm. ENDOMETRIUM:           Endometrial thickness 6.2 mm.   No fluid or m Cervix: Dilated 3-4 cm with fibroid mass at os                     Adnexa: non tender, no masses, normal size     ASSESSMENT:    A: 37 y.o.  with heavy menses, iron deficiency anemia and submucous uterine fibroid palpable/visible on e Reflex Request    PRESCRIPTIONS:     Requested Prescriptions      No prescriptions requested or ordered in this encounter     IMAGING/ REFERRALS:    None     Tejas Rosario MD  5/2/2019  10:08 PM

## 2019-05-17 ENCOUNTER — OFFICE VISIT (OUTPATIENT)
Dept: INTERNAL MEDICINE CLINIC | Facility: CLINIC | Age: 44
End: 2019-05-17
Payer: MEDICAID

## 2019-05-17 ENCOUNTER — NURSE TRIAGE (OUTPATIENT)
Dept: OTHER | Age: 44
End: 2019-05-17

## 2019-05-17 ENCOUNTER — LAB ENCOUNTER (OUTPATIENT)
Dept: LAB | Age: 44
End: 2019-05-17
Attending: INTERNAL MEDICINE
Payer: MEDICAID

## 2019-05-17 VITALS
SYSTOLIC BLOOD PRESSURE: 133 MMHG | DIASTOLIC BLOOD PRESSURE: 84 MMHG | HEIGHT: 66 IN | BODY MASS INDEX: 21.86 KG/M2 | RESPIRATION RATE: 14 BRPM | TEMPERATURE: 100 F | HEART RATE: 112 BPM | WEIGHT: 136 LBS

## 2019-05-17 DIAGNOSIS — R10.13 EPIGASTRIC PAIN: Primary | ICD-10-CM

## 2019-05-17 DIAGNOSIS — R10.13 EPIGASTRIC PAIN: ICD-10-CM

## 2019-05-17 PROBLEM — J03.90 TONSILLITIS: Status: RESOLVED | Noted: 2018-12-14 | Resolved: 2019-05-17

## 2019-05-17 PROCEDURE — 99214 OFFICE O/P EST MOD 30 MIN: CPT | Performed by: INTERNAL MEDICINE

## 2019-05-17 PROCEDURE — 83690 ASSAY OF LIPASE: CPT

## 2019-05-17 PROCEDURE — 85025 COMPLETE CBC W/AUTO DIFF WBC: CPT

## 2019-05-17 PROCEDURE — 99212 OFFICE O/P EST SF 10 MIN: CPT | Performed by: INTERNAL MEDICINE

## 2019-05-17 PROCEDURE — 36415 COLL VENOUS BLD VENIPUNCTURE: CPT

## 2019-05-17 PROCEDURE — 80053 COMPREHEN METABOLIC PANEL: CPT

## 2019-05-17 PROCEDURE — 81001 URINALYSIS AUTO W/SCOPE: CPT

## 2019-05-17 RX ORDER — OMEPRAZOLE 40 MG/1
40 CAPSULE, DELAYED RELEASE ORAL DAILY
Qty: 30 CAPSULE | Refills: 0 | Status: SHIPPED | OUTPATIENT
Start: 2019-05-17 | End: 2019-06-16

## 2019-05-17 NOTE — PROGRESS NOTES
HPI:    Patient ID: Marquita Christianson is a 37year old female. Abdominal Pain   This is a new problem. The current episode started yesterday. The onset quality is sudden. The problem occurs intermittently. The problem has been gradually improving.  The pa well-developed and well-nourished. She is cooperative. Non-toxic appearance. She does not have a sickly appearance. She does not appear ill. No distress. HENT:   Head: Normocephalic and atraumatic.    Right Ear: External ear normal.   Left Ear: External taking peptobismol and prilosec today. Pt denies being pregnant. Suspect possible gastritis/PUD. We will continue with omeprazole. We will check cbc, cmp, lipase and ua. We will also check H pylori stool test. Pt was told to ffup with PCP next week.  Go to

## 2019-05-17 NOTE — TELEPHONE ENCOUNTER
Action Requested: Summary for Provider     []  Critical Lab, Recommendations Needed  [] Need Additional Advice  []   FYI    []   Need Orders  [] Need Medications Sent to Pharmacy  []  Other     Language Olga Eason #[id number]    SUMMARY: Patient requesting appt toda

## 2019-05-18 ENCOUNTER — TELEPHONE (OUTPATIENT)
Dept: INTERNAL MEDICINE CLINIC | Facility: CLINIC | Age: 44
End: 2019-05-18

## 2019-05-18 NOTE — TELEPHONE ENCOUNTER
Notify pt; her labs showed ua,cmp, lipase were all normal; her cbc showed her anemia is better. She still needs to do her stool test for H pylori. She needs to ffup next week  With her PCP Dr Chelsi Gonsales.

## 2019-05-18 NOTE — TELEPHONE ENCOUNTER
Patient contacted (Name and  of pt verified). All results and recommendations reviewed. Patient verbalizes understanding, denies further questions and agrees with plan of care.   She intends to have stool test done by Tuesday.  (patient was told to wait

## 2019-06-17 ENCOUNTER — TELEPHONE (OUTPATIENT)
Dept: OBGYN CLINIC | Facility: CLINIC | Age: 44
End: 2019-06-17

## 2019-06-17 NOTE — TELEPHONE ENCOUNTER
Pt Name and  verified. Pt received a letter stating she needed to complete orders for Dr. Sophia Ott, however this letter was sent to her by her PCP.  Pt informed that all testing for JF were completed and she would need to contact PCPs office to clarify wh

## 2019-07-14 DIAGNOSIS — D50.0 IRON DEFICIENCY ANEMIA DUE TO CHRONIC BLOOD LOSS: ICD-10-CM

## 2019-07-15 RX ORDER — FERROUS SULFATE TAB EC 324 MG (65 MG FE EQUIVALENT) 324 (65 FE) MG
TABLET DELAYED RESPONSE ORAL
Qty: 90 TABLET | Refills: 0 | Status: SHIPPED | OUTPATIENT
Start: 2019-07-15 | End: 2019-07-30

## 2019-07-15 NOTE — TELEPHONE ENCOUNTER
Review pended refill request as it does not fall under a protocol.     Last Rx: 4/23/19  #90  LOV: 5/17/19

## 2019-07-30 ENCOUNTER — OFFICE VISIT (OUTPATIENT)
Dept: FAMILY MEDICINE CLINIC | Facility: CLINIC | Age: 44
End: 2019-07-30
Payer: MEDICAID

## 2019-07-30 VITALS
HEIGHT: 60 IN | TEMPERATURE: 98 F | HEART RATE: 109 BPM | BODY MASS INDEX: 27.92 KG/M2 | SYSTOLIC BLOOD PRESSURE: 116 MMHG | DIASTOLIC BLOOD PRESSURE: 76 MMHG | WEIGHT: 142.19 LBS

## 2019-07-30 DIAGNOSIS — D50.0 IRON DEFICIENCY ANEMIA DUE TO CHRONIC BLOOD LOSS: ICD-10-CM

## 2019-07-30 DIAGNOSIS — N92.0 MENORRHAGIA WITH REGULAR CYCLE: Primary | ICD-10-CM

## 2019-07-30 DIAGNOSIS — D25.1 INTRAMURAL AND SUBMUCOUS LEIOMYOMA OF UTERUS: ICD-10-CM

## 2019-07-30 DIAGNOSIS — D25.0 INTRAMURAL AND SUBMUCOUS LEIOMYOMA OF UTERUS: ICD-10-CM

## 2019-07-30 PROCEDURE — 99213 OFFICE O/P EST LOW 20 MIN: CPT | Performed by: FAMILY MEDICINE

## 2019-07-30 RX ORDER — FERROUS SULFATE TAB EC 324 MG (65 MG FE EQUIVALENT) 324 (65 FE) MG
1 TABLET DELAYED RESPONSE ORAL 3 TIMES DAILY
Qty: 90 TABLET | Refills: 0 | Status: SHIPPED | OUTPATIENT
Start: 2019-07-30 | End: 2020-11-16

## 2019-07-30 RX ORDER — FOLIC ACID 1 MG/1
1 TABLET ORAL DAILY
Qty: 90 TABLET | Refills: 0 | Status: SHIPPED | OUTPATIENT
Start: 2019-07-30 | End: 2020-11-16

## 2019-07-30 NOTE — PROGRESS NOTES
7/30/2019  2:02 PM    Leandra Elaine is a 37year old female. Chief complaint(s): Patient presents with: Follow - Up: Anemia    HPI:     Mobley Curet primary complaint is regarding anemia and uterine fibroids.      Twan Tabares for [facility] Tab EC Take 1 tablet by mouth 3 (three) times daily. Disp: 90 tablet Rfl: 0   folic acid 1 MG Oral Tab Take 1 tablet (1 mg total) by mouth daily.  Disp: 90 tablet Rfl: 0   ergocalciferol 75358 units Oral Cap TK 1 C PO 1 TIME A WK Disp:  Rfl: 4       Allergi Alkaline Phosphatase 86 37 - 98 U/L    Bilirubin, Total 0.3 0.1 - 2.0 mg/dL    Total Protein 8.2 6.4 - 8.2 g/dL    Albumin 3.4 3.4 - 5.0 g/dL    Globulin  4.8 (H) 2.8 - 4.4 g/dL    A/G Ratio 0.7 (L) 1.0 - 2.0    FASTING No    LIPASE   Result Value Ref Rang loss  Menorrhagia with regular cycle  (primary encounter diagnosis)  Intramural and submucous leiomyoma of uterus    Menstrual cycles improved, anemia improved  MEDICATIONS:  •  Ferrous Sulfate 324 (65 Fe) MG Oral Tab EC, Take 1 tablet by mouth 3 (three) t

## 2019-09-01 ENCOUNTER — HOSPITAL ENCOUNTER (OUTPATIENT)
Age: 44
Discharge: HOME | End: 2019-09-01
Attending: EMERGENCY MEDICINE
Payer: MEDICAID

## 2019-09-01 VITALS
HEART RATE: 87 BPM | SYSTOLIC BLOOD PRESSURE: 116 MMHG | WEIGHT: 142 LBS | OXYGEN SATURATION: 100 % | TEMPERATURE: 98 F | DIASTOLIC BLOOD PRESSURE: 77 MMHG | BODY MASS INDEX: 28 KG/M2 | RESPIRATION RATE: 18 BRPM

## 2019-09-01 DIAGNOSIS — H66.001 NON-RECURRENT ACUTE SUPPURATIVE OTITIS MEDIA OF RIGHT EAR WITHOUT SPONTANEOUS RUPTURE OF TYMPANIC MEMBRANE: Primary | ICD-10-CM

## 2019-09-01 LAB — S PYO AG THROAT QL: NEGATIVE

## 2019-09-01 PROCEDURE — 99213 OFFICE O/P EST LOW 20 MIN: CPT

## 2019-09-01 PROCEDURE — 99214 OFFICE O/P EST MOD 30 MIN: CPT

## 2019-09-01 PROCEDURE — 87430 STREP A AG IA: CPT

## 2019-09-01 RX ORDER — AMOXICILLIN 500 MG/1
500 TABLET, FILM COATED ORAL 3 TIMES DAILY
Qty: 30 TABLET | Refills: 0 | Status: SHIPPED | OUTPATIENT
Start: 2019-09-01 | End: 2019-09-11

## 2019-09-01 NOTE — ED PROVIDER NOTES
Patient Seen in: [facility]IN[facility] Immediate Care In [address] [place] [address]    History   Patient presents with:  Sore Throat    Stated Complaint: sorethroat/ear pain    HPI    Patient is a 42-year-old female who presents to immediate care complaining of right sided th well-nourished. HENT:   Head: Normocephalic and atraumatic. Right Ear: There is tenderness. Tympanic membrane is injected and erythematous. Mouth/Throat: Oropharynx is clear and moist and mucous membranes are normal. No oropharyngeal exudate.    Eyes:

## 2019-09-12 ENCOUNTER — OFFICE VISIT (OUTPATIENT)
Dept: OBGYN CLINIC | Facility: CLINIC | Age: 44
End: 2019-09-12
Payer: MEDICAID

## 2019-09-12 ENCOUNTER — TELEPHONE (OUTPATIENT)
Dept: OBGYN CLINIC | Facility: CLINIC | Age: 44
End: 2019-09-12

## 2019-09-12 VITALS
SYSTOLIC BLOOD PRESSURE: 131 MMHG | HEART RATE: 96 BPM | BODY MASS INDEX: 28 KG/M2 | WEIGHT: 142 LBS | DIASTOLIC BLOOD PRESSURE: 80 MMHG

## 2019-09-12 DIAGNOSIS — N92.0 MENORRHAGIA WITH REGULAR CYCLE: Primary | ICD-10-CM

## 2019-09-12 DIAGNOSIS — D25.0 SUBMUCOUS LEIOMYOMA OF UTERUS: ICD-10-CM

## 2019-09-12 DIAGNOSIS — D50.0 IRON DEFICIENCY ANEMIA DUE TO CHRONIC BLOOD LOSS: ICD-10-CM

## 2019-09-12 DIAGNOSIS — D50.0 IRON DEFICIENCY ANEMIA SECONDARY TO BLOOD LOSS (CHRONIC): ICD-10-CM

## 2019-09-12 PROCEDURE — 99214 OFFICE O/P EST MOD 30 MIN: CPT | Performed by: OBSTETRICS & GYNECOLOGY

## 2019-09-13 NOTE — TELEPHONE ENCOUNTER
Pt informed of U/S MD ordered for her to have done, and consent form to sign for sx. Pt understood and verbalized agreement to come in to [facility] for both.     Date & Time: 11/14 @ 7:30  CPT: 03615/76836  DX: see order below  PA#: [id number]

## 2019-09-13 NOTE — TELEPHONE ENCOUNTER
Please schedule the following surgery:    Procedure: Latricia Price assisted total laparoscopic hysterectomy with bilateral salpingectomy and possible laparotomy  Assist: (Y/N or none) yes  Date:  At patient's convenience  Dx: (N92.0) Menorrhagia with regular cyc

## 2019-09-16 ENCOUNTER — TELEPHONE (OUTPATIENT)
Dept: OBGYN CLINIC | Facility: CLINIC | Age: 44
End: 2019-09-16

## 2019-09-16 ENCOUNTER — MED REC SCAN ONLY (OUTPATIENT)
Dept: OBGYN CLINIC | Facility: CLINIC | Age: 44
End: 2019-09-16

## 2019-09-16 NOTE — TELEPHONE ENCOUNTER
Pt called to inform that she will be coming in today at 1:30 so she can sign the sx consent form and bring in paperwork.

## 2019-09-16 NOTE — TELEPHONE ENCOUNTER
Pt Name and  verified. Pt is calling in regards to coming in today to sign consent form for sx and leave paperwork as well. Sx  informed.

## 2019-09-17 NOTE — TELEPHONE ENCOUNTER
Madie Bacon agreed to assist. Submitted request to add him to this case.    -pre-cert/letter pending no

## 2019-09-17 NOTE — TELEPHONE ENCOUNTER
The patient will need 4 weeks off of work if she has a laparoscopy were 6 weeks if we need to convert to a laparotomy.

## 2019-09-17 NOTE — TELEPHONE ENCOUNTER
Pts consent form received and off to scan. Pt decided to schedule her sx in November. Pt informed of sx details. She understood and verbalized agreement. Dr. Sophia Ott, patient states you ordered her to have a physical exam as well. Please advise.   Info

## 2019-09-18 NOTE — TELEPHONE ENCOUNTER
Letter for work generated for patient. Called pt to inform she can come to any of our locations to . She Understood and verbalized agreement. Please assist patient and print letter for her.

## 2019-09-30 ENCOUNTER — HOSPITAL ENCOUNTER (OUTPATIENT)
Dept: ULTRASOUND IMAGING | Age: 44
Discharge: HOME | End: 2019-09-30
Attending: OBSTETRICS & GYNECOLOGY
Payer: MEDICAID

## 2019-09-30 DIAGNOSIS — D25.0 SUBMUCOUS LEIOMYOMA OF UTERUS: ICD-10-CM

## 2019-09-30 DIAGNOSIS — N92.0 MENORRHAGIA WITH REGULAR CYCLE: ICD-10-CM

## 2019-09-30 PROCEDURE — 76856 US EXAM PELVIC COMPLETE: CPT | Performed by: OBSTETRICS & GYNECOLOGY

## 2019-09-30 PROCEDURE — 76830 TRANSVAGINAL US NON-OB: CPT | Performed by: OBSTETRICS & GYNECOLOGY

## 2019-10-09 RX ORDER — ERGOCALCIFEROL 1.25 MG/1
CAPSULE ORAL
Qty: 12 CAPSULE | Refills: 0 | Status: SHIPPED | OUTPATIENT
Start: 2019-10-09 | End: 2020-11-16

## 2019-10-09 NOTE — TELEPHONE ENCOUNTER
To reception staff, pls call pt for appt. Review pended refill request as it does not fall under a protocol.   Requested Prescriptions     Pending Prescriptions Disp Refills   • ERGOCALCIFEROL 82454 units Oral Cap [Pharmacy Med Name: VITAMIN D2 50,0

## 2019-10-30 ENCOUNTER — IMMUNIZATION (OUTPATIENT)
Dept: FAMILY MEDICINE CLINIC | Facility: CLINIC | Age: 44
End: 2019-10-30
Payer: MEDICAID

## 2019-10-30 DIAGNOSIS — Z23 NEED FOR VACCINATION: ICD-10-CM

## 2019-10-30 PROCEDURE — 90471 IMMUNIZATION ADMIN: CPT | Performed by: NURSE PRACTITIONER

## 2019-10-30 PROCEDURE — 90686 IIV4 VACC NO PRSV 0.5 ML IM: CPT | Performed by: NURSE PRACTITIONER

## 2019-11-07 ENCOUNTER — OFFICE VISIT (OUTPATIENT)
Dept: OBGYN CLINIC | Facility: CLINIC | Age: 44
End: 2019-11-07
Payer: MEDICAID

## 2019-11-07 VITALS
BODY MASS INDEX: 28 KG/M2 | DIASTOLIC BLOOD PRESSURE: 89 MMHG | SYSTOLIC BLOOD PRESSURE: 137 MMHG | WEIGHT: 141 LBS | HEART RATE: 97 BPM

## 2019-11-07 DIAGNOSIS — Z01.818 PRE-OP TESTING: Primary | ICD-10-CM

## 2019-11-07 PROCEDURE — 99213 OFFICE O/P EST LOW 20 MIN: CPT | Performed by: OBSTETRICS & GYNECOLOGY

## 2019-11-08 NOTE — PROGRESS NOTES
[facility], [facility]  Obstetrics and Gynecology  Focused Gynecology Problem Exam  Cosmo Hitchcock MD    Katelyn Clements is a 53year old female presenting for Follow - Up (Uterine fibroid)  . HPI:   Patient presents with:   Follow - Up: Uterine fibroid    Pa Pulmonary Disease Father    • Stroke Father        Social History    Socioeconomic History      Marital status:       Spouse name: Not on file      Number of children: Not on file      Years of education: Not on file      Highest education level: No US: FINDINGS:           UTERUS:           Enlarged heterogeneous lobulated fibroid uterus measures 12.7 x 5.7 x 7.1 cm. ENDOMETRIUM:           Endometrial thickness is 4.2 mm.   No fluid or mass in the endometrial canal.    MYOMETRIUM:             Dom fibroid noted at cervix.                      Adnexa: non tender, no masses, normal size     ASSESSMENT:    A: 53 y.o.  with large fibroid uterus leading to menstrual symptoms.    (Z01.818) Pre-op testing  (primary encounter diagnosis)  Plan: CBC WIT the patient requires a laparotomy which would require 3-4 days of recovery in the hospital.  Attends all of the above and has consented to the procedure  This is a 20 minute visit and greater than 50% of the time was spent counseling the patient and/or

## 2019-11-11 ENCOUNTER — TELEPHONE (OUTPATIENT)
Dept: OBGYN CLINIC | Facility: CLINIC | Age: 44
End: 2019-11-11

## 2019-11-11 NOTE — TELEPHONE ENCOUNTER
I just spoke with patient on the phone. I counseled her that we could proceed with the surgery while she is on her menses. She will take Motrin 400 600 mg every 6 hours for today and tomorrow to help with bleeding.

## 2019-11-11 NOTE — TELEPHONE ENCOUNTER
Pt has surgery scheduled on 11/14, but has her cycle. Would like to be advised further on coming in or taking something to reduce the bleeding. pls adv further.  Tamazight speaker

## 2019-11-13 ENCOUNTER — LAB ENCOUNTER (OUTPATIENT)
Dept: LAB | Age: 44
End: 2019-11-13
Attending: OBSTETRICS & GYNECOLOGY
Payer: MEDICAID

## 2019-11-13 DIAGNOSIS — Z01.818 PRE-OP TESTING: ICD-10-CM

## 2019-11-13 PROCEDURE — 85025 COMPLETE CBC W/AUTO DIFF WBC: CPT

## 2019-11-13 PROCEDURE — 86901 BLOOD TYPING SEROLOGIC RH(D): CPT

## 2019-11-13 PROCEDURE — 36415 COLL VENOUS BLD VENIPUNCTURE: CPT

## 2019-11-13 PROCEDURE — 86900 BLOOD TYPING SEROLOGIC ABO: CPT

## 2019-11-13 PROCEDURE — 86850 RBC ANTIBODY SCREEN: CPT

## 2019-11-14 ENCOUNTER — ANESTHESIA EVENT (OUTPATIENT)
Dept: SURGERY | Facility: HOSPITAL | Age: 44
End: 2019-11-14
Payer: MEDICAID

## 2019-11-14 ENCOUNTER — ANESTHESIA (OUTPATIENT)
Dept: SURGERY | Facility: HOSPITAL | Age: 44
End: 2019-11-14
Payer: MEDICAID

## 2019-11-14 ENCOUNTER — HOSPITAL ENCOUNTER (OUTPATIENT)
Facility: HOSPITAL | Age: 44
Discharge: HOME | End: 2019-11-15
Attending: OBSTETRICS & GYNECOLOGY | Admitting: OBSTETRICS & GYNECOLOGY
Payer: MEDICAID

## 2019-11-14 DIAGNOSIS — D50.0 IRON DEFICIENCY ANEMIA SECONDARY TO BLOOD LOSS (CHRONIC): ICD-10-CM

## 2019-11-14 DIAGNOSIS — N92.0 MENORRHAGIA WITH REGULAR CYCLE: ICD-10-CM

## 2019-11-14 DIAGNOSIS — D25.0 SUBMUCOUS LEIOMYOMA OF UTERUS: ICD-10-CM

## 2019-11-14 PROBLEM — D25.9 FIBROID UTERUS: Status: ACTIVE | Noted: 2019-11-14

## 2019-11-14 LAB — B-HCG UR QL: NEGATIVE

## 2019-11-14 PROCEDURE — 0UT24ZZ RESECTION OF BILATERAL OVARIES, PERCUTANEOUS ENDOSCOPIC APPROACH: ICD-10-PCS | Performed by: OBSTETRICS & GYNECOLOGY

## 2019-11-14 PROCEDURE — 0UT94ZZ RESECTION OF UTERUS, PERCUTANEOUS ENDOSCOPIC APPROACH: ICD-10-PCS | Performed by: OBSTETRICS & GYNECOLOGY

## 2019-11-14 PROCEDURE — 8E0W4CZ ROBOTIC ASSISTED PROCEDURE OF TRUNK REGION, PERCUTANEOUS ENDOSCOPIC APPROACH: ICD-10-PCS | Performed by: OBSTETRICS & GYNECOLOGY

## 2019-11-14 PROCEDURE — 0UT74ZZ RESECTION OF BILATERAL FALLOPIAN TUBES, PERCUTANEOUS ENDOSCOPIC APPROACH: ICD-10-PCS | Performed by: OBSTETRICS & GYNECOLOGY

## 2019-11-14 PROCEDURE — 58573 TLH W/T/O UTERUS OVER 250 G: CPT | Performed by: OBSTETRICS & GYNECOLOGY

## 2019-11-14 RX ORDER — MORPHINE SULFATE 4 MG/ML
4 INJECTION, SOLUTION INTRAMUSCULAR; INTRAVENOUS EVERY 2 HOUR PRN
Status: DISCONTINUED | OUTPATIENT
Start: 2019-11-14 | End: 2019-11-15

## 2019-11-14 RX ORDER — HYDROCODONE BITARTRATE AND ACETAMINOPHEN 5; 325 MG/1; MG/1
1 TABLET ORAL EVERY 6 HOURS PRN
Status: DISCONTINUED | OUTPATIENT
Start: 2019-11-14 | End: 2019-11-15

## 2019-11-14 RX ORDER — HYDROMORPHONE HYDROCHLORIDE 1 MG/ML
0.2 INJECTION, SOLUTION INTRAMUSCULAR; INTRAVENOUS; SUBCUTANEOUS EVERY 5 MIN PRN
Status: DISCONTINUED | OUTPATIENT
Start: 2019-11-14 | End: 2019-11-14 | Stop reason: HOSPADM

## 2019-11-14 RX ORDER — HYDROCODONE BITARTRATE AND ACETAMINOPHEN 5; 325 MG/1; MG/1
2 TABLET ORAL AS NEEDED
Status: DISCONTINUED | OUTPATIENT
Start: 2019-11-14 | End: 2019-11-14 | Stop reason: HOSPADM

## 2019-11-14 RX ORDER — ACETAMINOPHEN 500 MG
1000 TABLET ORAL ONCE
Status: COMPLETED | OUTPATIENT
Start: 2019-11-14 | End: 2019-11-14

## 2019-11-14 RX ORDER — LIDOCAINE HYDROCHLORIDE 10 MG/ML
INJECTION, SOLUTION EPIDURAL; INFILTRATION; INTRACAUDAL; PERINEURAL AS NEEDED
Status: DISCONTINUED | OUTPATIENT
Start: 2019-11-14 | End: 2019-11-14 | Stop reason: SURG

## 2019-11-14 RX ORDER — DEXAMETHASONE SODIUM PHOSPHATE 4 MG/ML
VIAL (ML) INJECTION AS NEEDED
Status: DISCONTINUED | OUTPATIENT
Start: 2019-11-14 | End: 2019-11-14 | Stop reason: SURG

## 2019-11-14 RX ORDER — MORPHINE SULFATE 2 MG/ML
2 INJECTION, SOLUTION INTRAMUSCULAR; INTRAVENOUS EVERY 2 HOUR PRN
Status: DISCONTINUED | OUTPATIENT
Start: 2019-11-14 | End: 2019-11-15

## 2019-11-14 RX ORDER — CEFAZOLIN SODIUM/WATER 2 G/20 ML
2 SYRINGE (ML) INTRAVENOUS ONCE
Status: COMPLETED | OUTPATIENT
Start: 2019-11-14 | End: 2019-11-14

## 2019-11-14 RX ORDER — MORPHINE SULFATE 2 MG/ML
1 INJECTION, SOLUTION INTRAMUSCULAR; INTRAVENOUS EVERY 2 HOUR PRN
Status: DISCONTINUED | OUTPATIENT
Start: 2019-11-14 | End: 2019-11-15

## 2019-11-14 RX ORDER — HYDROMORPHONE HYDROCHLORIDE 1 MG/ML
0.4 INJECTION, SOLUTION INTRAMUSCULAR; INTRAVENOUS; SUBCUTANEOUS EVERY 5 MIN PRN
Status: DISCONTINUED | OUTPATIENT
Start: 2019-11-14 | End: 2019-11-14 | Stop reason: HOSPADM

## 2019-11-14 RX ORDER — MORPHINE SULFATE 4 MG/ML
4 INJECTION, SOLUTION INTRAMUSCULAR; INTRAVENOUS EVERY 10 MIN PRN
Status: DISCONTINUED | OUTPATIENT
Start: 2019-11-14 | End: 2019-11-14 | Stop reason: HOSPADM

## 2019-11-14 RX ORDER — SODIUM CHLORIDE 0.9 % (FLUSH) 0.9 %
10 SYRINGE (ML) INJECTION AS NEEDED
Status: DISCONTINUED | OUTPATIENT
Start: 2019-11-14 | End: 2019-11-15

## 2019-11-14 RX ORDER — ONDANSETRON 2 MG/ML
INJECTION INTRAMUSCULAR; INTRAVENOUS AS NEEDED
Status: DISCONTINUED | OUTPATIENT
Start: 2019-11-14 | End: 2019-11-14 | Stop reason: SURG

## 2019-11-14 RX ORDER — ONDANSETRON 2 MG/ML
4 INJECTION INTRAMUSCULAR; INTRAVENOUS ONCE AS NEEDED
Status: DISCONTINUED | OUTPATIENT
Start: 2019-11-14 | End: 2019-11-14 | Stop reason: HOSPADM

## 2019-11-14 RX ORDER — METOCLOPRAMIDE 10 MG/1
10 TABLET ORAL ONCE
Status: DISCONTINUED | OUTPATIENT
Start: 2019-11-14 | End: 2019-11-14 | Stop reason: HOSPADM

## 2019-11-14 RX ORDER — ROCURONIUM BROMIDE 10 MG/ML
INJECTION, SOLUTION INTRAVENOUS AS NEEDED
Status: DISCONTINUED | OUTPATIENT
Start: 2019-11-14 | End: 2019-11-14 | Stop reason: SURG

## 2019-11-14 RX ORDER — BUPIVACAINE HYDROCHLORIDE 2.5 MG/ML
INJECTION, SOLUTION EPIDURAL; INFILTRATION; INTRACAUDAL AS NEEDED
Status: DISCONTINUED | OUTPATIENT
Start: 2019-11-14 | End: 2019-11-14 | Stop reason: HOSPADM

## 2019-11-14 RX ORDER — HYDROMORPHONE HYDROCHLORIDE 1 MG/ML
0.6 INJECTION, SOLUTION INTRAMUSCULAR; INTRAVENOUS; SUBCUTANEOUS EVERY 5 MIN PRN
Status: DISCONTINUED | OUTPATIENT
Start: 2019-11-14 | End: 2019-11-14 | Stop reason: HOSPADM

## 2019-11-14 RX ORDER — HYDROCODONE BITARTRATE AND ACETAMINOPHEN 5; 325 MG/1; MG/1
1 TABLET ORAL AS NEEDED
Status: DISCONTINUED | OUTPATIENT
Start: 2019-11-14 | End: 2019-11-14 | Stop reason: HOSPADM

## 2019-11-14 RX ORDER — ONDANSETRON 4 MG/1
4 TABLET, FILM COATED ORAL EVERY 8 HOURS PRN
Status: DISCONTINUED | OUTPATIENT
Start: 2019-11-14 | End: 2019-11-15

## 2019-11-14 RX ORDER — NALOXONE HYDROCHLORIDE 0.4 MG/ML
80 INJECTION, SOLUTION INTRAMUSCULAR; INTRAVENOUS; SUBCUTANEOUS AS NEEDED
Status: DISCONTINUED | OUTPATIENT
Start: 2019-11-14 | End: 2019-11-14 | Stop reason: HOSPADM

## 2019-11-14 RX ORDER — KETOROLAC TROMETHAMINE 30 MG/ML
INJECTION, SOLUTION INTRAMUSCULAR; INTRAVENOUS AS NEEDED
Status: DISCONTINUED | OUTPATIENT
Start: 2019-11-14 | End: 2019-11-14 | Stop reason: SURG

## 2019-11-14 RX ORDER — SODIUM CHLORIDE, SODIUM LACTATE, POTASSIUM CHLORIDE, CALCIUM CHLORIDE 600; 310; 30; 20 MG/100ML; MG/100ML; MG/100ML; MG/100ML
INJECTION, SOLUTION INTRAVENOUS CONTINUOUS
Status: DISCONTINUED | OUTPATIENT
Start: 2019-11-14 | End: 2019-11-14 | Stop reason: HOSPADM

## 2019-11-14 RX ORDER — IBUPROFEN 600 MG/1
600 TABLET ORAL EVERY 6 HOURS PRN
Status: DISCONTINUED | OUTPATIENT
Start: 2019-11-16 | End: 2019-11-15

## 2019-11-14 RX ORDER — MORPHINE SULFATE 10 MG/ML
6 INJECTION, SOLUTION INTRAMUSCULAR; INTRAVENOUS EVERY 10 MIN PRN
Status: DISCONTINUED | OUTPATIENT
Start: 2019-11-14 | End: 2019-11-14 | Stop reason: HOSPADM

## 2019-11-14 RX ORDER — MORPHINE SULFATE 2 MG/ML
2 INJECTION, SOLUTION INTRAMUSCULAR; INTRAVENOUS EVERY 10 MIN PRN
Status: DISCONTINUED | OUTPATIENT
Start: 2019-11-14 | End: 2019-11-14 | Stop reason: HOSPADM

## 2019-11-14 RX ORDER — SODIUM CHLORIDE, SODIUM LACTATE, POTASSIUM CHLORIDE, CALCIUM CHLORIDE 600; 310; 30; 20 MG/100ML; MG/100ML; MG/100ML; MG/100ML
INJECTION, SOLUTION INTRAVENOUS CONTINUOUS
Status: DISCONTINUED | OUTPATIENT
Start: 2019-11-14 | End: 2019-11-15

## 2019-11-14 RX ORDER — PROCHLORPERAZINE EDISYLATE 5 MG/ML
5 INJECTION INTRAMUSCULAR; INTRAVENOUS ONCE AS NEEDED
Status: DISCONTINUED | OUTPATIENT
Start: 2019-11-14 | End: 2019-11-14 | Stop reason: HOSPADM

## 2019-11-14 RX ORDER — ONDANSETRON 2 MG/ML
4 INJECTION INTRAMUSCULAR; INTRAVENOUS EVERY 8 HOURS PRN
Status: DISCONTINUED | OUTPATIENT
Start: 2019-11-14 | End: 2019-11-15

## 2019-11-14 RX ORDER — SODIUM CHLORIDE 9 MG/ML
INJECTION, SOLUTION INTRAVENOUS CONTINUOUS PRN
Status: DISCONTINUED | OUTPATIENT
Start: 2019-11-14 | End: 2019-11-14 | Stop reason: SURG

## 2019-11-14 RX ORDER — KETOROLAC TROMETHAMINE 30 MG/ML
30 INJECTION, SOLUTION INTRAMUSCULAR; INTRAVENOUS EVERY 6 HOURS
Status: DISCONTINUED | OUTPATIENT
Start: 2019-11-14 | End: 2019-11-15

## 2019-11-14 RX ORDER — FAMOTIDINE 20 MG/1
20 TABLET ORAL ONCE
Status: DISCONTINUED | OUTPATIENT
Start: 2019-11-14 | End: 2019-11-14 | Stop reason: HOSPADM

## 2019-11-14 RX ORDER — GLYCOPYRROLATE 0.2 MG/ML
INJECTION INTRAMUSCULAR; INTRAVENOUS AS NEEDED
Status: DISCONTINUED | OUTPATIENT
Start: 2019-11-14 | End: 2019-11-14 | Stop reason: SURG

## 2019-11-14 RX ADMIN — SODIUM CHLORIDE: 9 INJECTION, SOLUTION INTRAVENOUS at 10:01:00

## 2019-11-14 RX ADMIN — ROCURONIUM BROMIDE 10 MG: 10 INJECTION, SOLUTION INTRAVENOUS at 11:22:00

## 2019-11-14 RX ADMIN — LIDOCAINE HYDROCHLORIDE 50 MG: 10 INJECTION, SOLUTION EPIDURAL; INFILTRATION; INTRACAUDAL; PERINEURAL at 07:40:00

## 2019-11-14 RX ADMIN — ROCURONIUM BROMIDE 10 MG: 10 INJECTION, SOLUTION INTRAVENOUS at 08:19:00

## 2019-11-14 RX ADMIN — SODIUM CHLORIDE: 9 INJECTION, SOLUTION INTRAVENOUS at 12:31:00

## 2019-11-14 RX ADMIN — ROCURONIUM BROMIDE 10 MG: 10 INJECTION, SOLUTION INTRAVENOUS at 10:49:00

## 2019-11-14 RX ADMIN — ROCURONIUM BROMIDE 10 MG: 10 INJECTION, SOLUTION INTRAVENOUS at 08:50:00

## 2019-11-14 RX ADMIN — SODIUM CHLORIDE: 9 INJECTION, SOLUTION INTRAVENOUS at 10:00:00

## 2019-11-14 RX ADMIN — SODIUM CHLORIDE: 9 INJECTION, SOLUTION INTRAVENOUS at 07:51:00

## 2019-11-14 RX ADMIN — ROCURONIUM BROMIDE 10 MG: 10 INJECTION, SOLUTION INTRAVENOUS at 09:19:00

## 2019-11-14 RX ADMIN — ROCURONIUM BROMIDE 10 MG: 10 INJECTION, SOLUTION INTRAVENOUS at 09:50:00

## 2019-11-14 RX ADMIN — ONDANSETRON 4 MG: 2 INJECTION INTRAMUSCULAR; INTRAVENOUS at 12:01:00

## 2019-11-14 RX ADMIN — DEXAMETHASONE SODIUM PHOSPHATE 4 MG: 4 MG/ML VIAL (ML) INJECTION at 07:40:00

## 2019-11-14 RX ADMIN — GLYCOPYRROLATE 0.2 MG: 0.2 INJECTION INTRAMUSCULAR; INTRAVENOUS at 07:40:00

## 2019-11-14 RX ADMIN — ROCURONIUM BROMIDE 50 MG: 10 INJECTION, SOLUTION INTRAVENOUS at 07:40:00

## 2019-11-14 RX ADMIN — SODIUM CHLORIDE, SODIUM LACTATE, POTASSIUM CHLORIDE, CALCIUM CHLORIDE: 600; 310; 30; 20 INJECTION, SOLUTION INTRAVENOUS at 07:40:00

## 2019-11-14 RX ADMIN — SODIUM CHLORIDE, SODIUM LACTATE, POTASSIUM CHLORIDE, CALCIUM CHLORIDE: 600; 310; 30; 20 INJECTION, SOLUTION INTRAVENOUS at 10:00:00

## 2019-11-14 RX ADMIN — SODIUM CHLORIDE, SODIUM LACTATE, POTASSIUM CHLORIDE, CALCIUM CHLORIDE: 600; 310; 30; 20 INJECTION, SOLUTION INTRAVENOUS at 12:29:00

## 2019-11-14 RX ADMIN — CEFAZOLIN SODIUM/WATER 2 G: 2 G/20 ML SYRINGE (ML) INTRAVENOUS at 11:42:00

## 2019-11-14 RX ADMIN — KETOROLAC TROMETHAMINE 30 MG: 30 INJECTION, SOLUTION INTRAMUSCULAR; INTRAVENOUS at 12:01:00

## 2019-11-14 RX ADMIN — CEFAZOLIN SODIUM/WATER 2 G: 2 G/20 ML SYRINGE (ML) INTRAVENOUS at 07:51:00

## 2019-11-14 NOTE — INTERVAL H&P NOTE
Pre-op Diagnosis: Menorrhagia with regular cycle [N92.0]  Submucous leiomyoma of uterus [D25.0]  Iron deficiency anemia secondary to blood loss (chronic) [D50.0]    The above referenced H&P was reviewed by Benita Griffin MD, MD on 11/14/2019, the shelly

## 2019-11-14 NOTE — ANESTHESIA PROCEDURE NOTES
Airway  Urgency: elective    Airway not difficult    General Information and Staff    Patient location during procedure: OR  Anesthesiologist: Reinaldo Campbell MD  Resident/CRNA: Maryellen Vincent CRNA  Performed: CRNA     Indications and Patient C

## 2019-11-14 NOTE — H&P
[facility]), [facility]  Obstetrics and Gynecology  Focused Gynecology Problem Exam  MD Zion Valenciaashlyn Schuster is a 53year old female presenting for Follow - Up (Uterine fibroid)  .     HPI:   Patient presents with:   Follow - Up: Uterine fibroid    Family History   Problem Relation Age of Onset   • Pulmonary Disease Father     • Stroke Father             Social History       Socioeconomic History      Marital status:       Spouse name: Not on file      Number of children: Not on file      [place] Asked    Social History Narrative      Not on file         Pelvic US:   FINDINGS:           UTERUS:           Enlarged heterogeneous lobulated fibroid uterus measures 12.7 x 5.7 x 7.1 cm.     ENDOMETRIUM:           Endometrial thickness is 4.2 mm.  No flui deviates to the patient's left side. Cervix: fibroid noted at cervix.                      Adnexa: non tender, no masses, normal size     ASSESSMENT:    A: 53 y.o.  with large fibroid uterus leading to menstrual symptoms.     (Z01 procedure.   Discussed recovery and planned hospital stay of 1-2 days unless the patient requires a laparotomy which would require 3-4 days of recovery in the hospital.  Attends all of the above and has consented to the procedure  This is a 20 minute visit

## 2019-11-14 NOTE — ANESTHESIA PREPROCEDURE EVALUATION
Anesthesia PreOp Note    HPI:     Petros Silva is a 53year old female who presents for preoperative consultation requested by: Geena Bautista MD    Date of Surgery: 11/14/2019    Procedure(s):  XI ROBOT-ASSISTED LAPAROSCOPIC HYSTERECTOMY  Indicati pain    Family History   Problem Relation Age of Onset   • Pulmonary Disease Father    • Stroke Father      Social History    Socioeconomic History      Marital status:       Spouse name: Not on file      Number of children: Not on file      Years o Social History Narrative      Not on file      Available pre-op labs reviewed.   Lab Results   Component Value Date    WBC 5.8 11/13/2019    RBC 5.09 11/13/2019    HGB 11.2 (L) 11/13/2019    HCT 37.0 11/13/2019    MCV 72.7 (L) 11/13/2019    MCH 22.0 (L) 11/

## 2019-11-14 NOTE — PLAN OF CARE
Amitted to [phone] from PACU. Oriented to room and fall precautions. Abdominal lap sites with bandaids and 2x2 clean/dry/intact with abdominal binder in place. Pizano. Scheduled toradol and norco for pain. Zofran for nausea.  Bed in lowest position, call light i

## 2019-11-15 VITALS
SYSTOLIC BLOOD PRESSURE: 100 MMHG | HEIGHT: 62 IN | TEMPERATURE: 99 F | RESPIRATION RATE: 18 BRPM | OXYGEN SATURATION: 100 % | WEIGHT: 137 LBS | DIASTOLIC BLOOD PRESSURE: 64 MMHG | BODY MASS INDEX: 25.21 KG/M2 | HEART RATE: 88 BPM

## 2019-11-15 LAB
HCT VFR BLD AUTO: 28.9 % (ref 35–48)
HGB BLD-MCNC: 8.8 G/DL (ref 12–16)

## 2019-11-15 RX ORDER — IBUPROFEN 600 MG/1
600 TABLET ORAL EVERY 6 HOURS PRN
Qty: 30 TABLET | Refills: 1 | Status: SHIPPED | OUTPATIENT
Start: 2019-11-15

## 2019-11-15 RX ORDER — HYDROCODONE BITARTRATE AND ACETAMINOPHEN 5; 325 MG/1; MG/1
1 TABLET ORAL EVERY 6 HOURS PRN
Qty: 16 TABLET | Refills: 0 | Status: SHIPPED | OUTPATIENT
Start: 2019-11-15 | End: 2020-11-16

## 2019-11-15 RX ORDER — BISACODYL 10 MG
10 SUPPOSITORY, RECTAL RECTAL
Status: DISCONTINUED | OUTPATIENT
Start: 2019-11-15 | End: 2019-11-15

## 2019-11-15 RX ORDER — SIMETHICONE 80 MG
80 TABLET,CHEWABLE ORAL
Status: DISCONTINUED | OUTPATIENT
Start: 2019-11-15 | End: 2019-11-15

## 2019-11-15 RX ORDER — IBUPROFEN 600 MG/1
600 TABLET ORAL EVERY 6 HOURS PRN
Status: DISCONTINUED | OUTPATIENT
Start: 2019-11-15 | End: 2019-11-15

## 2019-11-15 NOTE — OPERATIVE REPORT
[facility]    PATIENT'S NAME: Cynthia Cap   ATTENDING PHYSICIAN: Eleonora Norman MD   OPERATING PHYSICIAN: Eleonora Norman MD   PATIENT ACCOUNT#:   [id number]    LOCATION:  [address] [place] [address] #:   [id number]       DATE OF B to 2 cm and was approximately 90% effaced with the submucous fibroid visible on speculum exam.  The uterine manipulator was easily placed and a Pizano catheter was placed.   At this time, attention was then turned to the abdomen where 0.25% Marcaine was used we were unable to visualize the [facility] uterine manipulator posteriorly due to the large fibroid. At this time, I decided to proceed with the hysterectomy with this uterus sitting on top of the large fibroid.   The tubes were both removed bilaterally under t uterus was excised at the level just described and set to the side. The fibroid was then dissected out with sharp and blunt dissection. The anterior portion was excised down to the level of the uterine manipulator cuff.   The cuff was then excised in a ci

## 2019-11-15 NOTE — BRIEF OP NOTE
[facility] 4W/SW/SE  Operative Note     Seamus Nina Location: OR   [facility] [id number] MRN [id number]   Admission Date 11/14/2019 Operation Date 11/14/2019   Attending Physician Calli Guevara MD Operating Physician Mejia King MD, MD

## 2019-11-15 NOTE — PLAN OF CARE
A&Ox4. Upx1 SB in room, tolerating well. RN talked to Dr. Kristy Prater and MD talked directly with patient via telephone. Stated okay to removed jacob tonight if patient is ambulating and up in chair. Jacob removed.  Pt advanced to GI soft diet this AM. P comfort level or patient's stated pain goal  Description  INTERVENTIONS:  - Encourage pt to monitor pain and request assistance  - Assess pain using appropriate pain scale  - Administer analgesics based on type and severity of pain and evaluate response  - care, etc)  - Arrange for interpreters to assist at discharge as needed  - Consider post-discharge preferences of patient/family/discharge partner  - Complete POLST form as appropriate  - Assess patient's ability to be responsible for managing their own he

## 2019-11-15 NOTE — PLAN OF CARE
Plan to discharge home today once pt has passed gas. Voiding WNL post jacob removal. Tolerating low fiber/soft diet. Abdominal lap sites clean/dry/intact. Pain managed with norco. Bed in lowest position, call light in reach, fall precautions in place.     1

## 2019-11-17 NOTE — DISCHARGE SUMMARY
[facility]    daVinci Procedure Discharge Summary    [facility] Patient Status:  Outpatient in a Bed    1975 MRN [id number]   Location [facility] 4W/SW/SE Attending No att. providers found   Hosp Day # 0 PCP [facility] [id number] 11/14/19 XI ROBOT-ASSISTED LAPAROSCOPIC HYSTERECTOMY Cherelle Alejandro MD [facility] OR Comp          Discharge Plan:   Discharge Condition: Stable    Discharge medications:  Discharge Medication List as of 11/15/2019  2:10 PM    New Orders    HYD

## 2019-12-03 ENCOUNTER — OFFICE VISIT (OUTPATIENT)
Dept: OBGYN CLINIC | Facility: CLINIC | Age: 44
End: 2019-12-03
Payer: MEDICAID

## 2019-12-03 VITALS
SYSTOLIC BLOOD PRESSURE: 131 MMHG | BODY MASS INDEX: 25 KG/M2 | HEART RATE: 73 BPM | DIASTOLIC BLOOD PRESSURE: 84 MMHG | WEIGHT: 135 LBS | TEMPERATURE: 98 F

## 2019-12-03 DIAGNOSIS — D25.9 UTERINE LEIOMYOMA, UNSPECIFIED LOCATION: ICD-10-CM

## 2019-12-03 DIAGNOSIS — Z98.890 POST-OPERATIVE STATE: Primary | ICD-10-CM

## 2019-12-03 PROCEDURE — 99024 POSTOP FOLLOW-UP VISIT: CPT | Performed by: OBSTETRICS & GYNECOLOGY

## 2019-12-31 ENCOUNTER — LAB ENCOUNTER (OUTPATIENT)
Dept: LAB | Age: 44
End: 2019-12-31
Attending: OBSTETRICS & GYNECOLOGY
Payer: MEDICAID

## 2019-12-31 ENCOUNTER — OFFICE VISIT (OUTPATIENT)
Dept: OBGYN CLINIC | Facility: CLINIC | Age: 44
End: 2019-12-31
Payer: MEDICAID

## 2019-12-31 ENCOUNTER — TELEPHONE (OUTPATIENT)
Dept: OBGYN CLINIC | Facility: CLINIC | Age: 44
End: 2019-12-31

## 2019-12-31 VITALS
HEART RATE: 97 BPM | WEIGHT: 133 LBS | SYSTOLIC BLOOD PRESSURE: 123 MMHG | DIASTOLIC BLOOD PRESSURE: 82 MMHG | BODY MASS INDEX: 24 KG/M2

## 2019-12-31 DIAGNOSIS — Z86.2 HISTORY OF ANEMIA: ICD-10-CM

## 2019-12-31 DIAGNOSIS — Z98.890 POST-OPERATIVE STATE: Primary | ICD-10-CM

## 2019-12-31 DIAGNOSIS — N92.0 MENORRHAGIA WITH REGULAR CYCLE: ICD-10-CM

## 2019-12-31 DIAGNOSIS — D25.9 UTERINE LEIOMYOMA, UNSPECIFIED LOCATION: ICD-10-CM

## 2019-12-31 LAB
BASOPHILS # BLD AUTO: 0.05 X10(3) UL (ref 0–0.2)
BASOPHILS NFR BLD AUTO: 0.9 %
DEPRECATED RDW RBC AUTO: 49.8 FL (ref 35.1–46.3)
EOSINOPHIL # BLD AUTO: 0.27 X10(3) UL (ref 0–0.7)
EOSINOPHIL NFR BLD AUTO: 5.1 %
ERYTHROCYTE [DISTWIDTH] IN BLOOD BY AUTOMATED COUNT: 19.4 % (ref 11–15)
HCT VFR BLD AUTO: 38.4 % (ref 35–48)
HGB BLD-MCNC: 11.7 G/DL (ref 12–16)
IMM GRANULOCYTES # BLD AUTO: 0.01 X10(3) UL (ref 0–1)
IMM GRANULOCYTES NFR BLD: 0.2 %
LYMPHOCYTES # BLD AUTO: 1.96 X10(3) UL (ref 1–4)
LYMPHOCYTES NFR BLD AUTO: 37.2 %
MCH RBC QN AUTO: 22.1 PG (ref 26–34)
MCHC RBC AUTO-ENTMCNC: 30.5 G/DL (ref 31–37)
MCV RBC AUTO: 72.5 FL (ref 80–100)
MONOCYTES # BLD AUTO: 0.63 X10(3) UL (ref 0.1–1)
MONOCYTES NFR BLD AUTO: 12 %
NEUTROPHILS # BLD AUTO: 2.35 X10 (3) UL (ref 1.5–7.7)
NEUTROPHILS # BLD AUTO: 2.35 X10(3) UL (ref 1.5–7.7)
NEUTROPHILS NFR BLD AUTO: 44.6 %
PLATELET # BLD AUTO: 318 10(3)UL (ref 150–450)
RBC # BLD AUTO: 5.3 X10(6)UL (ref 3.8–5.3)
WBC # BLD AUTO: 5.3 X10(3) UL (ref 4–11)

## 2019-12-31 PROCEDURE — 85025 COMPLETE CBC W/AUTO DIFF WBC: CPT

## 2019-12-31 PROCEDURE — 99024 POSTOP FOLLOW-UP VISIT: CPT | Performed by: OBSTETRICS & GYNECOLOGY

## 2019-12-31 PROCEDURE — 36415 COLL VENOUS BLD VENIPUNCTURE: CPT

## 2019-12-31 NOTE — PROGRESS NOTES
[facility], [facility]  Obstetrics and Gynecology  Focused Gynecology Problem Exam  Carrol Aaron MD    Mary Jane Conley is a 53year old female presenting for No chief complaint on file. Sandee Ortega HPI:   No chief complaint on file.     Patient is status post da Zeb Past Medical History:   Diagnosis Date   • Ovarian cyst 2003    per NG: L ovarian [id number]/29/2003   • PONV (postoperative nausea and vomiting)        Past Surgical History:   Procedure Laterality Date   • OTHER      UTERINE F Service: Not Asked        Blood Transfusions: Not Asked        Caffeine Concern: Yes          per NG: coffee once a month        Occupational Exposure: Not Asked        Hobby Hazards: Not Asked        Sleep Concern: Not Asked        Stress Concern: Not Ask postoperative pain but that she does not have any indication of a specific complication. I discussed the option of a CT of the abdomen and pelvis to evaluate for complications. She has declined and states that she will wait a couple more weeks.   I recomm

## 2020-01-02 NOTE — TELEPHONE ENCOUNTER
----- Message from Lake Oliveira MD sent at 1/2/2020  7:30 AM CST -----  Result noted. Please notify patient that her hemoglobin is 11.7 and improved. She should hold off on her iron for at least 2 weeks to see if her symptoms improve.

## 2020-01-15 ENCOUNTER — TELEPHONE (OUTPATIENT)
Dept: OBGYN CLINIC | Facility: CLINIC | Age: 45
End: 2020-01-15

## 2020-01-15 NOTE — TELEPHONE ENCOUNTER
Per pt had surgery with LETICIA, already came in for her post op, states she needs a note to return to work, please advise Welsh speaking

## 2020-06-11 NOTE — PROGRESS NOTES
[facility], [facility]  Obstetrics and Gynecology  Focused Gynecology Problem Exam  Cosmo Hitchcock MD    Katelyn Clements is a 53year old female presenting for Follow - Up (on Fibroids)  . HPI:   Patient presents with:   Follow - Up: on Fibroids    Pt is retu OVARIAN CYST(S)  03/29/2003    per NG: L ovarian cystectomy       Family History   Problem Relation Age of Onset   • Pulmonary Disease Father    • Stroke Father        Social History    Socioeconomic History      Marital status:       Spouse name: N Asked        Seat Belt: Not Asked        Self-Exams: Not Asked    Social History Narrative      Not on file      ROS:   See HPI  PHYSICAL EXAM:   /80   Pulse 96   Wt 142 lb (64.4 kg)   LMP 09/06/2019 (Exact Date)   BMI 27.73 kg/m²     GENERAL: well d left ovary.  No free pelvic fluid              Dictated by (CST): Nanci Kim MD on 10/08/2018 at 11:13       Approved by (CST): Nanci Kim MD on 10/08/2018 at 11:19            ASSESSMENT:    A: 53 y.o.  large submucosal uterine fibr Patient understands all the above and is elected to proceed with procedure. This is a 30 minute visit and greater than 50% of the time was spent counseling the patient and/or coordinating care.     ORDERS:   No orders of the defined types were placed in th No

## 2020-10-14 ENCOUNTER — IMMUNIZATION (OUTPATIENT)
Dept: FAMILY MEDICINE CLINIC | Facility: CLINIC | Age: 45
End: 2020-10-14
Payer: MEDICAID

## 2020-10-14 DIAGNOSIS — Z23 NEED FOR VACCINATION: ICD-10-CM

## 2020-10-14 PROCEDURE — 90471 IMMUNIZATION ADMIN: CPT | Performed by: FAMILY MEDICINE

## 2020-10-14 PROCEDURE — 90686 IIV4 VACC NO PRSV 0.5 ML IM: CPT | Performed by: FAMILY MEDICINE

## 2020-11-16 ENCOUNTER — LAB ENCOUNTER (OUTPATIENT)
Dept: LAB | Age: 45
End: 2020-11-16
Attending: FAMILY MEDICINE
Payer: MEDICAID

## 2020-11-16 ENCOUNTER — OFFICE VISIT (OUTPATIENT)
Dept: FAMILY MEDICINE CLINIC | Facility: CLINIC | Age: 45
End: 2020-11-16
Payer: MEDICAID

## 2020-11-16 VITALS
TEMPERATURE: 98 F | DIASTOLIC BLOOD PRESSURE: 88 MMHG | SYSTOLIC BLOOD PRESSURE: 131 MMHG | WEIGHT: 152.38 LBS | HEIGHT: 62 IN | HEART RATE: 95 BPM | BODY MASS INDEX: 28.04 KG/M2

## 2020-11-16 DIAGNOSIS — Z12.31 VISIT FOR SCREENING MAMMOGRAM: ICD-10-CM

## 2020-11-16 DIAGNOSIS — Z00.00 PHYSICAL EXAM: Primary | ICD-10-CM

## 2020-11-16 DIAGNOSIS — Z00.00 PHYSICAL EXAM: ICD-10-CM

## 2020-11-16 PROCEDURE — 85025 COMPLETE CBC W/AUTO DIFF WBC: CPT

## 2020-11-16 PROCEDURE — 36415 COLL VENOUS BLD VENIPUNCTURE: CPT

## 2020-11-16 PROCEDURE — 3008F BODY MASS INDEX DOCD: CPT | Performed by: FAMILY MEDICINE

## 2020-11-16 PROCEDURE — 83036 HEMOGLOBIN GLYCOSYLATED A1C: CPT

## 2020-11-16 PROCEDURE — 81015 MICROSCOPIC EXAM OF URINE: CPT | Performed by: FAMILY MEDICINE

## 2020-11-16 PROCEDURE — 99396 PREV VISIT EST AGE 40-64: CPT | Performed by: FAMILY MEDICINE

## 2020-11-16 PROCEDURE — 81001 URINALYSIS AUTO W/SCOPE: CPT | Performed by: FAMILY MEDICINE

## 2020-11-16 PROCEDURE — 80061 LIPID PANEL: CPT

## 2020-11-16 PROCEDURE — 82306 VITAMIN D 25 HYDROXY: CPT | Performed by: FAMILY MEDICINE

## 2020-11-16 PROCEDURE — 3079F DIAST BP 80-89 MM HG: CPT | Performed by: FAMILY MEDICINE

## 2020-11-16 PROCEDURE — 84443 ASSAY THYROID STIM HORMONE: CPT

## 2020-11-16 PROCEDURE — 3075F SYST BP GE 130 - 139MM HG: CPT | Performed by: FAMILY MEDICINE

## 2020-11-16 PROCEDURE — 80053 COMPREHEN METABOLIC PANEL: CPT

## 2020-11-16 NOTE — PROGRESS NOTES
11/16/2020  9:34 AM    Anthony Mendoza is a 50year old female. Chief complaint(s): Patient presents with:  Routine Physical    HPI:     Anthony Mendoza primary complaint is regarding CPE.      Anthony Mendoza is a 50year old female present today for 6 months and older PFS 0.5 ml (16334)                          10/23/2018      Flucelvax 0.5ml Vaccine                          01/09/2018      Influenza             12/09/2008  10/28/2016      TDAP                  08/27/2018      Medications (Active prio bilaterally. Normal nasal septum, throat clear without lesions or exudate and normal tonsils. Neck: Neck supple. Cardiovascular: Normal rate, regular rhythm, S1 normal and S2 normal.    No murmur heard. Pulmonary/Chest: No respiratory distress.    Lung responsibilities: Recommendations on physical activity; exercise daily or at least 3 times a week for 30-60 minutes doing cardiovascular exercise. Patient educated on self breast examination to be done on a monthly basis.   Consider a  if ov

## 2020-11-23 RX ORDER — ERGOCALCIFEROL 1.25 MG/1
50000 CAPSULE ORAL WEEKLY
Qty: 12 CAPSULE | Refills: 4 | Status: SHIPPED | OUTPATIENT
Start: 2020-11-23 | End: 2020-12-23

## 2020-11-24 ENCOUNTER — OFFICE VISIT (OUTPATIENT)
Dept: FAMILY MEDICINE CLINIC | Facility: CLINIC | Age: 45
End: 2020-11-24
Payer: MEDICAID

## 2020-11-24 VITALS
DIASTOLIC BLOOD PRESSURE: 83 MMHG | HEART RATE: 114 BPM | WEIGHT: 151 LBS | BODY MASS INDEX: 27.79 KG/M2 | HEIGHT: 62 IN | SYSTOLIC BLOOD PRESSURE: 129 MMHG

## 2020-11-24 DIAGNOSIS — E78.00 PURE HYPERCHOLESTEROLEMIA: Primary | ICD-10-CM

## 2020-11-24 DIAGNOSIS — R31.21 ASYMPTOMATIC MICROSCOPIC HEMATURIA: ICD-10-CM

## 2020-11-24 DIAGNOSIS — Z12.31 VISIT FOR SCREENING MAMMOGRAM: ICD-10-CM

## 2020-11-24 DIAGNOSIS — E55.9 VITAMIN D DEFICIENCY: ICD-10-CM

## 2020-11-24 PROCEDURE — 3079F DIAST BP 80-89 MM HG: CPT | Performed by: FAMILY MEDICINE

## 2020-11-24 PROCEDURE — 99213 OFFICE O/P EST LOW 20 MIN: CPT | Performed by: FAMILY MEDICINE

## 2020-11-24 PROCEDURE — 81003 URINALYSIS AUTO W/O SCOPE: CPT | Performed by: FAMILY MEDICINE

## 2020-11-24 PROCEDURE — 3074F SYST BP LT 130 MM HG: CPT | Performed by: FAMILY MEDICINE

## 2020-11-24 PROCEDURE — 3008F BODY MASS INDEX DOCD: CPT | Performed by: FAMILY MEDICINE

## 2020-11-24 RX ORDER — ATORVASTATIN CALCIUM 10 MG/1
10 TABLET, FILM COATED ORAL NIGHTLY
Qty: 90 TABLET | Refills: 3 | Status: SHIPPED | OUTPATIENT
Start: 2020-11-24

## 2020-11-24 NOTE — PROGRESS NOTES
11/24/2020  11:32 AM    Courtney Quinn is a 50year old female. Chief complaint(s): Patient presents with:  Test Results: follow up    HPI:     Courtney Quinn primary complaint is regarding MULTIPLE ISSUES.      In regard to the hyperlipidemia, she h 10/23/2018  10/30/2019  10/14/2020      FLUZONE 6 months and older PFS 0.5 ml (68252)                          10/23/2018      Flucelvax 0.5ml Vaccine                          01/09/2018      Influenza             12/09/2008  10/28/2016 Spec Gravity 1.020 1.005 - 1.030    Blood Urine MODERATE Negative    PH Urine 7.5 4.5 - 8.0    Protein Urine NEGATIVE Negative/Trace mg/dL    Urobilinogen Urine 0.2 0.0 - 1.9 mg/dL    Nitrite Urine NEGATIVE Negative    Leukocyte Esterase Urine SMALL Neg [facility], [facility] if there is a deterioration or worsening of the medical condition. Also, inform the doctor with any new symptoms or medications' side effects.       FOLLOW-UP: Schedule a follow-up visit in  [address] [place] [address] / prn.        4. Visit for screening mammogram

## 2020-12-11 ENCOUNTER — HOSPITAL ENCOUNTER (OUTPATIENT)
Dept: ULTRASOUND IMAGING | Age: 45
Discharge: HOME | End: 2020-12-11
Attending: FAMILY MEDICINE
Payer: MEDICAID

## 2020-12-11 DIAGNOSIS — R31.21 ASYMPTOMATIC MICROSCOPIC HEMATURIA: ICD-10-CM

## 2020-12-11 PROCEDURE — 76770 US EXAM ABDO BACK WALL COMP: CPT | Performed by: FAMILY MEDICINE

## 2021-03-23 NOTE — PROGRESS NOTES
[facility], [facility]  Obstetrics and Gynecology  Focused Gynecology Problem Exam  Osmel Taylor MD    Ann Stone is a 53year old female presenting for Follow - Up (2 wks post-op. visit )  . HPI:   Patient presents with: Follow - Up: 2 wks post-op. Surgical History:   Procedure Laterality Date   • OTHER      UTERINE FIBROID REMOVAL   • REMOVAL OF OVARIAN CYST(S)  03/29/2003    per NG: L ovarian cystectomy   • XI ROBOT-ASSISTED LAPAROSCOPIC HYSTERECTOMY Bilateral 11/14/2019    Performed by Flori Almonte Not Asked        Sleep Concern: Not Asked        Stress Concern: Not Asked        Weight Concern: Not Asked        Special Diet: Not Asked        Back Care: Not Asked        Exercise: Not Asked        Bike Helmet: Not Asked        Seat Belt: Not Asked 23-Mar-2021 09:44

## 2021-03-27 ENCOUNTER — HOSPITAL ENCOUNTER (OUTPATIENT)
Dept: MAMMOGRAPHY | Age: 46
Discharge: HOME | End: 2021-03-27
Attending: FAMILY MEDICINE
Payer: MEDICAID

## 2021-03-27 DIAGNOSIS — Z12.31 VISIT FOR SCREENING MAMMOGRAM: ICD-10-CM

## 2021-03-27 DIAGNOSIS — Z00.00 PHYSICAL EXAM: ICD-10-CM

## 2021-08-13 ENCOUNTER — HOSPITAL ENCOUNTER (OUTPATIENT)
Age: 46
Discharge: HOME | End: 2021-08-13
Payer: MEDICAID

## 2021-08-13 ENCOUNTER — APPOINTMENT (OUTPATIENT)
Dept: GENERAL RADIOLOGY | Age: 46
End: 2021-08-13
Attending: NURSE PRACTITIONER
Payer: MEDICAID

## 2021-08-13 VITALS
DIASTOLIC BLOOD PRESSURE: 79 MMHG | OXYGEN SATURATION: 100 % | RESPIRATION RATE: 16 BRPM | TEMPERATURE: 99 F | SYSTOLIC BLOOD PRESSURE: 121 MMHG | HEART RATE: 77 BPM | BODY MASS INDEX: 28 KG/M2 | HEIGHT: 62 IN

## 2021-08-13 DIAGNOSIS — S90.31XA CONTUSION OF RIGHT FOOT, INITIAL ENCOUNTER: Primary | ICD-10-CM

## 2021-08-13 PROCEDURE — L3260 AMBULATORY SURGICAL BOOT EAC: HCPCS | Performed by: NURSE PRACTITIONER

## 2021-08-13 PROCEDURE — 73630 X-RAY EXAM OF FOOT: CPT | Performed by: NURSE PRACTITIONER

## 2021-08-13 PROCEDURE — 99213 OFFICE O/P EST LOW 20 MIN: CPT | Performed by: NURSE PRACTITIONER

## 2021-08-13 RX ORDER — IBUPROFEN 600 MG/1
600 TABLET ORAL ONCE
Status: COMPLETED | OUTPATIENT
Start: 2021-08-13 | End: 2021-08-13

## 2021-08-13 RX ORDER — IBUPROFEN 600 MG/1
600 TABLET ORAL EVERY 8 HOURS PRN
Qty: 30 TABLET | Refills: 0 | Status: SHIPPED | OUTPATIENT
Start: 2021-08-13 | End: 2021-08-20

## 2021-08-13 RX ORDER — TRAMADOL HYDROCHLORIDE 50 MG/1
TABLET ORAL EVERY 6 HOURS PRN
Qty: 10 TABLET | Refills: 0 | Status: SHIPPED | OUTPATIENT
Start: 2021-08-13 | End: 2021-08-20

## 2021-08-13 NOTE — ED INITIAL ASSESSMENT (HPI)
Pt states she was taking a frozen chicken out of the freezer and it fell on her right foot. Pt has pain in her right foot and ankle. Pt put vapor rub and salt to the foot.

## 2021-08-13 NOTE — ED PROVIDER NOTES
Patient Seen in: [facility]      History   Patient presents with: Foot Injury    Stated Complaint: foot injury     HPI/Subjective:   Alisa Joaquin 64-year-old female presenting to the immediate care complaining of right foot injury.   Ameena noted above.     Physical Exam     ED Triage Vitals [08/13/21 0917]   /79   Pulse 77   Resp 16   Temp 98.9 °F (37.2 °C)   Temp src Temporal   SpO2 100 %   O2 Device None (Room air)       Current:/79   Pulse 77   Temp 98.9 °F (37.2 °C) (Temporal) Mood and Affect: Mood normal.         Behavior: Behavior normal.         Thought Content:  Thought content normal.         Judgment: Judgment normal.           ED Course   Labs Reviewed - No data to display  XR FOOT, COMPLETE (MIN 3 VIEWS), RIGHT (CPT pertinent discharge summaries/testing. This includes but not limited to OP/IP visits, radiology tests , clinical labs tests, EKG's, and medication.            Disposition and Plan     Clinical Impression:  Contusion of right foot, initial encounter  (primar

## 2021-08-18 ENCOUNTER — HOSPITAL ENCOUNTER (OUTPATIENT)
Dept: MAMMOGRAPHY | Age: 46
Discharge: HOME | End: 2021-08-18
Attending: FAMILY MEDICINE
Payer: MEDICAID

## 2021-08-18 PROCEDURE — 77067 SCR MAMMO BI INCL CAD: CPT | Performed by: FAMILY MEDICINE

## 2021-08-18 PROCEDURE — 77063 BREAST TOMOSYNTHESIS BI: CPT | Performed by: FAMILY MEDICINE

## 2021-08-25 ENCOUNTER — HOSPITAL ENCOUNTER (OUTPATIENT)
Dept: MAMMOGRAPHY | Facility: HOSPITAL | Age: 46
Discharge: HOME | End: 2021-08-25
Attending: FAMILY MEDICINE
Payer: MEDICAID

## 2021-08-25 ENCOUNTER — HOSPITAL ENCOUNTER (OUTPATIENT)
Dept: ULTRASOUND IMAGING | Facility: HOSPITAL | Age: 46
Discharge: HOME | End: 2021-08-25
Attending: FAMILY MEDICINE
Payer: MEDICAID

## 2021-08-25 DIAGNOSIS — R92.8 ABNORMAL MAMMOGRAM: ICD-10-CM

## 2021-08-25 PROCEDURE — 76642 ULTRASOUND BREAST LIMITED: CPT | Performed by: FAMILY MEDICINE

## 2021-08-25 PROCEDURE — 77065 DX MAMMO INCL CAD UNI: CPT | Performed by: FAMILY MEDICINE

## 2021-08-25 PROCEDURE — 77061 BREAST TOMOSYNTHESIS UNI: CPT | Performed by: FAMILY MEDICINE

## 2021-11-18 ENCOUNTER — OFFICE VISIT (OUTPATIENT)
Dept: FAMILY MEDICINE CLINIC | Facility: CLINIC | Age: 46
End: 2021-11-18
Payer: MEDICAID

## 2021-11-18 VITALS
HEIGHT: 62 IN | BODY MASS INDEX: 26.57 KG/M2 | HEART RATE: 98 BPM | WEIGHT: 144.38 LBS | DIASTOLIC BLOOD PRESSURE: 85 MMHG | SYSTOLIC BLOOD PRESSURE: 150 MMHG

## 2021-11-18 DIAGNOSIS — Z00.00 PHYSICAL EXAM: Primary | ICD-10-CM

## 2021-11-18 DIAGNOSIS — Z12.11 COLON CANCER SCREENING: ICD-10-CM

## 2021-11-18 PROCEDURE — 3077F SYST BP >= 140 MM HG: CPT | Performed by: FAMILY MEDICINE

## 2021-11-18 PROCEDURE — 3079F DIAST BP 80-89 MM HG: CPT | Performed by: FAMILY MEDICINE

## 2021-11-18 PROCEDURE — 90686 IIV4 VACC NO PRSV 0.5 ML IM: CPT | Performed by: FAMILY MEDICINE

## 2021-11-18 PROCEDURE — 3008F BODY MASS INDEX DOCD: CPT | Performed by: FAMILY MEDICINE

## 2021-11-18 PROCEDURE — 90471 IMMUNIZATION ADMIN: CPT | Performed by: FAMILY MEDICINE

## 2021-11-18 PROCEDURE — 99396 PREV VISIT EST AGE 40-64: CPT | Performed by: FAMILY MEDICINE

## 2021-11-18 RX ORDER — ERGOCALCIFEROL 1.25 MG/1
CAPSULE ORAL
COMMUNITY
Start: 2021-11-17

## 2021-11-18 NOTE — PROGRESS NOTES
11/18/2021  4:50 PM    Ann Stone is a 48year old female. Chief complaint(s): Patient presents with:  Routine Physical    HPI:     Ann Stone primary complaint is regarding CPE.      Janes Pascual a 48year old female present today for Flucelvax 0.5ml Vaccine                          01/09/2018      Influenza             12/09/2008  10/28/2016      TDAP                  08/27/2018    Pended                  Date(s) Pended    FLULAVAL 6 months & older 0.5 ml Prefilled syringe (92024) normal.      Nose: Nose normal.      Mouth/Throat:      Mouth: Mucous membranes are moist.   Eyes:      Extraocular Movements: Extraocular movements intact.       Conjunctiva/sclera: Conjunctivae normal.      Pupils: Pupils are equal, round, and reactive to 25-Hydroxy      FLULAVAL INFLUENZA VACCINE QUAD PRESERVATIVE FREE 0.5 ML     REFERRALS: generated today : FLULAVAL INFLUENZA VACCINE QUAD PRESERVATIVE FREE 0.5 ML  EVALUATE & TREAT, GASTRO (INTERNAL) .     IMMUNIZATIONS ordered and given today include: Flu Referrals:  FLULAVAL INFLUENZA VACCINE QUAD PRESERVATIVE FREE 0.5 ML  EVALUATE & TREAT, GASTRO (INTERNAL)         Anil Khan MD

## 2021-12-15 ENCOUNTER — LAB ENCOUNTER (OUTPATIENT)
Dept: LAB | Age: 46
End: 2021-12-15
Attending: FAMILY MEDICINE
Payer: MEDICAID

## 2021-12-15 DIAGNOSIS — Z00.00 ROUTINE GENERAL MEDICAL EXAMINATION AT A HEALTH CARE FACILITY: Primary | ICD-10-CM

## 2021-12-15 PROCEDURE — 80053 COMPREHEN METABOLIC PANEL: CPT | Performed by: FAMILY MEDICINE

## 2021-12-15 PROCEDURE — 36415 COLL VENOUS BLD VENIPUNCTURE: CPT | Performed by: FAMILY MEDICINE

## 2021-12-15 PROCEDURE — 82306 VITAMIN D 25 HYDROXY: CPT | Performed by: FAMILY MEDICINE

## 2021-12-15 PROCEDURE — 81015 MICROSCOPIC EXAM OF URINE: CPT | Performed by: FAMILY MEDICINE

## 2021-12-15 PROCEDURE — 84443 ASSAY THYROID STIM HORMONE: CPT | Performed by: FAMILY MEDICINE

## 2021-12-15 PROCEDURE — 84439 ASSAY OF FREE THYROXINE: CPT | Performed by: FAMILY MEDICINE

## 2021-12-15 PROCEDURE — 81001 URINALYSIS AUTO W/SCOPE: CPT | Performed by: FAMILY MEDICINE

## 2021-12-15 PROCEDURE — 85025 COMPLETE CBC W/AUTO DIFF WBC: CPT | Performed by: FAMILY MEDICINE

## 2021-12-15 PROCEDURE — 83036 HEMOGLOBIN GLYCOSYLATED A1C: CPT | Performed by: FAMILY MEDICINE

## 2021-12-15 PROCEDURE — 80061 LIPID PANEL: CPT

## 2021-12-20 DIAGNOSIS — R79.89 ELEVATED TSH: Primary | ICD-10-CM

## 2022-02-08 RX ORDER — ERGOCALCIFEROL 1.25 MG/1
50000 CAPSULE ORAL WEEKLY
Qty: 12 CAPSULE | Refills: 0 | Status: SHIPPED | OUTPATIENT
Start: 2022-02-08

## 2022-03-28 ENCOUNTER — LAB ENCOUNTER (OUTPATIENT)
Dept: LAB | Age: 47
End: 2022-03-28
Attending: FAMILY MEDICINE
Payer: MEDICAID

## 2022-03-28 DIAGNOSIS — R79.89 ELEVATED TSH: ICD-10-CM

## 2022-03-28 LAB
T4 FREE SERPL-MCNC: 1.2 NG/DL (ref 0.8–1.7)
TSI SER-ACNC: 2.98 MIU/ML (ref 0.36–3.74)

## 2022-03-28 PROCEDURE — 84443 ASSAY THYROID STIM HORMONE: CPT

## 2022-03-28 PROCEDURE — 36415 COLL VENOUS BLD VENIPUNCTURE: CPT

## 2022-03-28 PROCEDURE — 84439 ASSAY OF FREE THYROXINE: CPT

## 2022-07-22 ENCOUNTER — OFFICE VISIT (OUTPATIENT)
Dept: FAMILY MEDICINE CLINIC | Facility: CLINIC | Age: 47
End: 2022-07-22
Payer: MEDICAID

## 2022-07-22 VITALS
HEART RATE: 76 BPM | TEMPERATURE: 97 F | SYSTOLIC BLOOD PRESSURE: 121 MMHG | DIASTOLIC BLOOD PRESSURE: 80 MMHG | WEIGHT: 152 LBS | HEIGHT: 62 IN | BODY MASS INDEX: 27.97 KG/M2

## 2022-07-22 DIAGNOSIS — M54.2 POSTERIOR NECK PAIN: ICD-10-CM

## 2022-07-22 DIAGNOSIS — H92.02 LEFT EAR PAIN: Primary | ICD-10-CM

## 2022-07-22 PROCEDURE — 99213 OFFICE O/P EST LOW 20 MIN: CPT | Performed by: FAMILY MEDICINE

## 2022-07-22 PROCEDURE — 3079F DIAST BP 80-89 MM HG: CPT | Performed by: FAMILY MEDICINE

## 2022-07-22 PROCEDURE — 3074F SYST BP LT 130 MM HG: CPT | Performed by: FAMILY MEDICINE

## 2022-07-22 PROCEDURE — 3008F BODY MASS INDEX DOCD: CPT | Performed by: FAMILY MEDICINE

## 2022-07-22 RX ORDER — CYCLOBENZAPRINE HCL 10 MG
10 TABLET ORAL 2 TIMES DAILY PRN
Qty: 30 TABLET | Refills: 0 | Status: SHIPPED | OUTPATIENT
Start: 2022-07-22

## 2022-07-22 RX ORDER — IBUPROFEN 600 MG/1
600 TABLET ORAL EVERY 8 HOURS PRN
Qty: 30 TABLET | Refills: 1 | Status: SHIPPED | OUTPATIENT
Start: 2022-07-22

## 2022-08-23 ENCOUNTER — TELEPHONE (OUTPATIENT)
Dept: FAMILY MEDICINE CLINIC | Facility: CLINIC | Age: 47
End: 2022-08-23

## 2022-08-23 DIAGNOSIS — Z12.31 BREAST CANCER SCREENING BY MAMMOGRAM: Primary | ICD-10-CM

## 2022-08-23 RX ORDER — ERGOCALCIFEROL 1.25 MG/1
50000 CAPSULE ORAL WEEKLY
Qty: 12 CAPSULE | Refills: 0 | Status: SHIPPED | OUTPATIENT
Start: 2022-08-23

## 2022-08-23 NOTE — TELEPHONE ENCOUNTER
Please review. Protocol failed / No protocol. Requested Prescriptions   Pending Prescriptions Disp Refills    ergocalciferol 1.25 MG (35697 UT) Oral Cap 12 capsule 1     Sig: Take 1 capsule (50,000 Units total) by mouth once a week.         There is no refill protocol information for this order               Recent Outpatient Visits              1 month ago Left ear pain    [address] Rom[address] MD    Office Visit    9 months ago Physical exam    [address] Romero MD    Office Visit    1 year ago Pure hypercholesterolemia    [address] Romero MD    Office Visit    1 year ago Physical exam    [address] Romero MD    Office Visit    2 years ago Post-operative [facility], [address], Adarsh Gaffney MD    Office Visit

## 2022-09-06 NOTE — TELEPHONE ENCOUNTER
Please review. Protocol Failed / No Protocol.     Requested Prescriptions   Pending Prescriptions Disp Refills    ERGOCALCIFEROL 1.25 MG (04201 UT) Oral Cap [Pharmacy Med Name: VITAMIN D2 50,000IU (ERGO) CAP RX] 12 capsule 0     Sig: TAKE 1 CAPSULE BY MOUTH 1 TIME A WEEK        There is no refill protocol information for this order           Recent Outpatient Visits              2 months ago Physical exam    [address] Romero MD    Office Visit    1 year ago Pure hypercholesterolemia    [address] Ad[address] Romero MD    Office Visit    1 year ago Physical exam    [address] Romero MD    Office Visit    2 years ago Post-operative [facility], [facility], Dari Rodney MD    Office Visit    2 years ago Post-operative [facility], [facility], Adarsh Rodney MD    Office Visit Transoral removal right submandibular gland stone

## 2022-09-14 ENCOUNTER — HOSPITAL ENCOUNTER (OUTPATIENT)
Dept: MAMMOGRAPHY | Age: 47
Discharge: HOME | End: 2022-09-14
Attending: FAMILY MEDICINE
Payer: MEDICAID

## 2022-09-14 DIAGNOSIS — Z12.31 BREAST CANCER SCREENING BY MAMMOGRAM: ICD-10-CM

## 2022-09-14 PROCEDURE — 77067 SCR MAMMO BI INCL CAD: CPT | Performed by: FAMILY MEDICINE

## 2022-09-14 PROCEDURE — 77063 BREAST TOMOSYNTHESIS BI: CPT | Performed by: FAMILY MEDICINE

## 2022-09-26 ENCOUNTER — HOSPITAL ENCOUNTER (OUTPATIENT)
Dept: MAMMOGRAPHY | Facility: HOSPITAL | Age: 47
Discharge: HOME | End: 2022-09-26
Attending: FAMILY MEDICINE

## 2022-09-26 ENCOUNTER — HOSPITAL ENCOUNTER (OUTPATIENT)
Dept: ULTRASOUND IMAGING | Facility: HOSPITAL | Age: 47
Discharge: HOME | End: 2022-09-26
Attending: FAMILY MEDICINE

## 2022-09-26 DIAGNOSIS — R92.8 ABNORMAL MAMMOGRAM: ICD-10-CM

## 2022-09-26 PROCEDURE — 77065 DX MAMMO INCL CAD UNI: CPT | Performed by: FAMILY MEDICINE

## 2022-09-26 PROCEDURE — 76642 ULTRASOUND BREAST LIMITED: CPT | Performed by: FAMILY MEDICINE

## 2022-09-26 PROCEDURE — 77061 BREAST TOMOSYNTHESIS UNI: CPT | Performed by: FAMILY MEDICINE

## 2022-10-08 ENCOUNTER — HOSPITAL ENCOUNTER (OUTPATIENT)
Age: 47
Discharge: HOME | End: 2022-10-08
Payer: MEDICAID

## 2022-10-08 VITALS
HEIGHT: 65 IN | HEART RATE: 97 BPM | SYSTOLIC BLOOD PRESSURE: 133 MMHG | WEIGHT: 150 LBS | RESPIRATION RATE: 16 BRPM | TEMPERATURE: 98 F | OXYGEN SATURATION: 98 % | DIASTOLIC BLOOD PRESSURE: 90 MMHG | BODY MASS INDEX: 24.99 KG/M2

## 2022-10-08 DIAGNOSIS — U07.1 COVID: ICD-10-CM

## 2022-10-08 DIAGNOSIS — Z20.822 ENCOUNTER FOR LABORATORY TESTING FOR COVID-19 VIRUS: Primary | ICD-10-CM

## 2022-10-08 LAB
S PYO AG THROAT QL: NEGATIVE
SARS-COV-2 RNA RESP QL NAA+PROBE: DETECTED

## 2022-10-08 RX ORDER — NIRMATRELVIR AND RITONAVIR 300-100 MG
KIT ORAL
Qty: 30 TABLET | Refills: 0 | Status: SHIPPED | OUTPATIENT
Start: 2022-10-08

## 2023-12-05 ENCOUNTER — HOSPITAL ENCOUNTER (OUTPATIENT)
Age: 48
Discharge: HOME | End: 2023-12-05
Payer: MEDICAID

## 2023-12-05 VITALS
RESPIRATION RATE: 18 BRPM | SYSTOLIC BLOOD PRESSURE: 139 MMHG | OXYGEN SATURATION: 100 % | HEART RATE: 79 BPM | WEIGHT: 140 LBS | BODY MASS INDEX: 25.76 KG/M2 | DIASTOLIC BLOOD PRESSURE: 80 MMHG | TEMPERATURE: 98 F | HEIGHT: 62 IN

## 2023-12-05 DIAGNOSIS — J02.0 STREPTOCOCCAL SORE THROAT: Primary | ICD-10-CM

## 2023-12-05 DIAGNOSIS — J06.9 VIRAL UPPER RESPIRATORY TRACT INFECTION: ICD-10-CM

## 2023-12-05 LAB
S PYO AG THROAT QL: NEGATIVE
SARS-COV-2 RNA RESP QL NAA+PROBE: NOT DETECTED

## 2023-12-05 PROCEDURE — 87880 STREP A ASSAY W/OPTIC: CPT

## 2023-12-05 PROCEDURE — U0002 COVID-19 LAB TEST NON-CDC: HCPCS

## 2023-12-05 PROCEDURE — 99213 OFFICE O/P EST LOW 20 MIN: CPT

## 2024-04-09 ENCOUNTER — HOSPITAL ENCOUNTER (OUTPATIENT)
Age: 49
Discharge: HOME | End: 2024-04-09
Payer: MEDICAID

## 2024-04-09 VITALS
TEMPERATURE: 98 F | SYSTOLIC BLOOD PRESSURE: 136 MMHG | OXYGEN SATURATION: 98 % | RESPIRATION RATE: 16 BRPM | DIASTOLIC BLOOD PRESSURE: 87 MMHG | HEART RATE: 88 BPM

## 2024-04-09 DIAGNOSIS — J02.9 SORE THROAT: ICD-10-CM

## 2024-04-09 DIAGNOSIS — J06.9 VIRAL URI: Primary | ICD-10-CM

## 2024-04-09 LAB
S PYO AG THROAT QL: NEGATIVE
SARS-COV-2 RNA RESP QL NAA+PROBE: NOT DETECTED

## 2024-04-09 PROCEDURE — 99213 OFFICE O/P EST LOW 20 MIN: CPT | Performed by: PHYSICIAN ASSISTANT

## 2024-04-09 PROCEDURE — 87880 STREP A ASSAY W/OPTIC: CPT | Performed by: PHYSICIAN ASSISTANT

## 2024-04-09 PROCEDURE — U0002 COVID-19 LAB TEST NON-CDC: HCPCS | Performed by: PHYSICIAN ASSISTANT

## 2024-04-09 NOTE — ED PROVIDER NOTES
Patient Seen in: [facility]      History     Chief Complaint   Patient presents with    Cough/URI     Stated Complaint: Ear Pain    Subjective:   HPI    47 yo F here with c/o 4 days URI symptoms. Sore throat, cough, congestion and R ear pain. Subjective fevers on 1st day. Taking otc meds with no improvement. Works in a school.  Requesting  COVID testing    Objective:   No pertinent past medical history.            No pertinent past surgical history.              No pertinent social history.            Review of Systems    Positive for stated complaint: Ear Pain  Other systems are as noted in HPI.  Constitutional and vital signs reviewed.      All other systems reviewed and negative except as noted above.    Physical Exam     ED Triage Vitals [04/09/24 0806]   /87   Pulse 88   Resp 16   Temp 98 °F (36.7 °C)   Temp src Temporal   SpO2 98 %   O2 Device None (Room air)       Current:/87   Pulse 88   Temp 98 °F (36.7 °C) (Temporal)   Resp 16   LMP 11/10/2019 (Exact Date)   SpO2 98%         Physical Exam  Vitals and nursing note reviewed.   Constitutional:       General: She is not in acute distress.  HENT:      Head: Normocephalic and atraumatic.      Right Ear: Tympanic membrane and external ear normal.      Left Ear: Tympanic membrane and external ear normal.      Nose: Nose normal.      Mouth/Throat:      Mouth: Mucous membranes are moist.   Eyes:      Extraocular Movements: Extraocular movements intact.      Pupils: Pupils are equal, round, and reactive to light.   Cardiovascular:      Rate and Rhythm: Normal rate.   Pulmonary:      Effort: Pulmonary effort is normal.      Breath sounds: No wheezing or rhonchi.   Abdominal:      General: Abdomen is flat.   Musculoskeletal:         General: Normal range of motion.      Cervical back: Normal range of motion.   Skin:     General: Skin is warm.   Neurological:      General: No focal deficit present.      Mental Status: She is alert and  oriented to person, place, and time.   Psychiatric:         Mood and Affect: Mood normal.         Behavior: Behavior normal.               ED Course     Labs Reviewed   POCT RAPID STREP - Normal   RAPID SARS-COV-2 BY PCR - Normal        48-year-old female presenting with URI symptoms x 4 days.  Well-appearing.  Afebrile.  No hypoxia, no tachycardia  Ddx-viral URI, strep pharyngitis, COVID, sinusitis  Strep, COVID are negative.  Discussed using Sudafed for symptoms.  Supportive care, anticipatory guidance.  Return precautions reviewed              [facility]                                         Medical Decision Making      Disposition and Plan     Clinical Impression:  1. Viral URI    2. Sore throat         Disposition:  Discharge  4/9/2024  8:40 am    Follow-up:  Wander Nunez MD  [address] [place] [address]  [phone]                Medications Prescribed:  Current Discharge Medication List

## 2024-04-09 NOTE — DISCHARGE INSTRUCTIONS
From the pharmacy purchase Sudafed for the congestion.     Cepacol lozenges or chloraseptic spray for sore throat

## 2024-09-12 ENCOUNTER — OFFICE VISIT (OUTPATIENT)
Dept: OCCUPATIONAL MEDICINE | Age: 49
End: 2024-09-12
Attending: NURSE PRACTITIONER

## 2024-09-12 ENCOUNTER — HOSPITAL ENCOUNTER (OUTPATIENT)
Dept: GENERAL RADIOLOGY | Age: 49
Discharge: HOME | End: 2024-09-12
Attending: FAMILY MEDICINE
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
## 2024-09-12 PROCEDURE — 73000 X-RAY EXAM OF COLLAR BONE: CPT | Performed by: FAMILY MEDICINE

## 2024-09-12 PROCEDURE — 73030 X-RAY EXAM OF SHOULDER: CPT | Performed by: FAMILY MEDICINE

## 2024-09-12 RX ORDER — TRAMADOL HYDROCHLORIDE 50 MG/1
50 TABLET ORAL EVERY 6 HOURS PRN
Qty: 30 TABLET | Refills: 0 | Status: SHIPPED | OUTPATIENT
Start: 2024-09-12

## 2024-10-04 ENCOUNTER — HOSPITAL ENCOUNTER (OUTPATIENT)
Dept: GENERAL RADIOLOGY | Facility: HOSPITAL | Age: 49
Discharge: HOME | End: 2024-10-04
Attending: ORTHOPAEDIC SURGERY
Payer: OTHER MISCELLANEOUS

## 2024-10-04 DIAGNOSIS — S42.391A OTH FRACTURE OF SHAFT OF RIGHT HUMERUS, INIT FOR CLOS FX: ICD-10-CM

## 2024-10-04 PROCEDURE — 73030 X-RAY EXAM OF SHOULDER: CPT | Performed by: ORTHOPAEDIC SURGERY

## 2025-01-26 ENCOUNTER — HOSPITAL ENCOUNTER (OUTPATIENT)
Age: 50
Discharge: HOME | End: 2025-01-26
Payer: MEDICAID

## 2025-01-26 VITALS
HEART RATE: 92 BPM | DIASTOLIC BLOOD PRESSURE: 83 MMHG | OXYGEN SATURATION: 100 % | TEMPERATURE: 98 F | RESPIRATION RATE: 18 BRPM | SYSTOLIC BLOOD PRESSURE: 115 MMHG

## 2025-01-26 DIAGNOSIS — R09.89 RUNNY NOSE: ICD-10-CM

## 2025-01-26 DIAGNOSIS — R50.9 FEVER: ICD-10-CM

## 2025-01-26 DIAGNOSIS — U07.1 COVID-19: Primary | ICD-10-CM

## 2025-01-26 LAB
POCT INFLUENZA A: NEGATIVE
POCT INFLUENZA B: NEGATIVE
S PYO AG THROAT QL: NEGATIVE
SARS-COV-2 RNA RESP QL NAA+PROBE: DETECTED

## 2025-01-26 NOTE — ED PROVIDER NOTES
Patient Seen in: [facility]      History     Chief Complaint   Patient presents with    Runny Nose     Stated Complaint: sore throat; cough; runny nose    Subjective: This is a 49-year-old female, past medical history of ovarian cyst, presents to immediate care clinic for evaluation of subjective fever, chills, headache, body aches.  Positive cough, congestion, postnasal drip, sore throat.  Patient states symptoms started on Friday.  She does not communicate chest pain, dizziness, lightness, palpitations, shortness of breath.  No abdominal pain, nausea, vomiting, diarrhea.  No recent travel.  Positive sick contacts, patient works at a school.  She did not test herself for COVID-19 when symptoms began.  She is well-appearing.  Speaking in complete full sentences without difficulty.  AOx4.  The history is provided by the patient.             Objective:     No pertinent past medical history.            No pertinent past surgical history.              No pertinent social history.            Review of Systems   Constitutional:  Positive for activity change, appetite change, chills, fatigue and fever.   HENT:  Positive for congestion, rhinorrhea, sneezing and sore throat. Negative for ear discharge, ear pain, postnasal drip, sinus pressure and sinus pain.    Eyes: Negative.    Respiratory:  Positive for cough.    Cardiovascular: Negative.    Gastrointestinal: Negative.    Genitourinary: Negative.    Musculoskeletal:  Positive for arthralgias and myalgias.   Skin: Negative.    Neurological:  Positive for headaches. Negative for dizziness, weakness and light-headedness.       Positive for stated complaint: sore throat; cough; runny nose  Other systems are as noted in HPI.  Constitutional and vital signs reviewed.      All other systems reviewed and negative except as noted above.    Physical Exam     ED Triage Vitals [01/26/25 0815]   /83   Pulse 92   Resp 18   Temp 98.4 °F (36.9 °C)   Temp src Oral    SpO2 100 %   O2 Device None (Room air)       Current Vitals:   Vital Signs  BP: 115/83  Pulse: 92  Resp: 18  Temp: 98.4 °F (36.9 °C)  Temp src: Oral    Oxygen Therapy  SpO2: 100 %  O2 Device: None (Room air)        Physical Exam  Constitutional:       General: She is not in acute distress.     Appearance: Normal appearance. She is not ill-appearing or toxic-appearing.   HENT:      Head: Normocephalic.      Right Ear: Tympanic membrane, ear canal and external ear normal.      Left Ear: Tympanic membrane, ear canal and external ear normal.      Nose: Congestion and rhinorrhea present.      Mouth/Throat:      Mouth: Mucous membranes are moist.      Pharynx: Posterior oropharyngeal erythema present. No oropharyngeal exudate.   Eyes:      General:         Right eye: No discharge.         Left eye: No discharge.      Extraocular Movements: Extraocular movements intact.      Pupils: Pupils are equal, round, and reactive to light.   Cardiovascular:      Rate and Rhythm: Normal rate and regular rhythm.      Pulses: Normal pulses.      Heart sounds: Normal heart sounds.   Pulmonary:      Effort: Pulmonary effort is normal. No respiratory distress.      Breath sounds: Normal breath sounds. No stridor. No wheezing, rhonchi or rales.   Chest:      Chest wall: No tenderness.   Musculoskeletal:         General: Normal range of motion.      Cervical back: Normal range of motion and neck supple.   Lymphadenopathy:      Cervical: Cervical adenopathy present.   Skin:     General: Skin is warm.      Capillary Refill: Capillary refill takes less than 2 seconds.   Neurological:      General: No focal deficit present.      Mental Status: She is alert and oriented to person, place, and time.             ED Course     Labs Reviewed   RAPID SARS-COV-2 BY PCR - Abnormal; Notable for the following components:       Result Value    Rapid SARS-CoV-2 by PCR Detected (*)     All other components within normal limits   POCT RAPID STREP - Normal    POCT FLU TEST - Normal    Narrative:     This assay is a rapid molecular in vitro test utilizing nucleic acid amplification of influenza A and B viral RNA.                   MDM      Differentials considered include: COVID-19, influenza A, influenza B, strep pharyngitis, viral URI, pneumonia.    Patient was first tested for influenza A and influenza B.  She is negative.    Her lungs are clear on exam.  No adventitious breath sounds.  No tachypnea, tachycardia, hypoxia.  Afebrile.  No evidence to suggest pneumonia or bronchitis.    Patient posterior pharynx with mild erythema.  No tonsillar enlargement, edema, exudate.  Uvula midline and normal in size.  Mucous membranes moist.  No intraoral lesions petechiae.  Positive bilateral cervical lymphadenopathy.  Nontender.  Patient is tolerating her own secretions well without difficulty.  No excessive drooling, stridor, voice change    Patient is negative for strep pharyngitis.    Patient is positive for COVID-19.  She is requesting Paxlovid.  Patient has been prescribed Paxlovid in the past, 2 years ago.  Did discuss with patient that this medication can come with rebound COVID-19 symptoms.  She verbalized understanding accepts the risks.    She is aware of CDC guidelines regarding isolation for 24 hours and wearing a mask for 5 days since symptoms began.  She works in the school and believes their policy states she needs to stay home for 5 days.  Note provided.    Patient is aware of additional supportive and symptomatic management at home.  She is aware of signs symptoms that warrant ER evaluation.        Medical Decision Making      Disposition and Plan     Clinical Impression:  1. COVID-19    2. Fever    3. Runny nose         Disposition:  Discharge  1/26/2025  8:55 am    Follow-up:  Wander Nunez MD  [address] [place] [address]  [phone]      As needed          Medications Prescribed:  Current Discharge Medication List        START taking  these medications    Details   !! nirmatrelvir-ritonavir 300-100 MG Oral Tablet Therapy Pack Take two nirmatrelvir tablets (300mg) with one ritonavir tablet (100mg) together twice daily for 5 days.  Qty: 30 tablet, Refills: 0       !! - Potential duplicate medications found. Please discuss with provider.              Supplementary Documentation:

## 2025-01-26 NOTE — DISCHARGE INSTRUCTIONS
[place] comentamos, eres negativo para influenza A, influenza B, faringitis estreptocócica.    Eres positivo para COVID-19.  Los CDC solo recomiendan que usted se ponga en cuarentena beatrice 24 horas y use charmaine mascarilla beatrice un total de 5 días desde que comenzaron kimberly síntomas.  Siempre que no tenga fiebre sin usar Tylenol o Motrin, puede volver a trabajar mañana, ying use charmaine mascarilla.    Angelika mucha agua y descanse mucho.  Dormir algo elevado y erguido.  Dormir con humidificador.  Duchas de vapor para la tos y la congestión.  Tylenol cada 4 horas y Motrin cada 6 horas.    Vaya a la pepe de emergencias si tiene dolor en el pecho, mareos, aturdimiento, palpitaciones o dificultad para respirar.        As discussed, you are negative for influenza A, influenza B, strep throat.    You are positive for COVID-19.  The CDC only recommends that you quarantine for 24 hours and wear mask for total of 5 days since her symptoms began.  As long as you are fever free without the use of Tylenol or Motrin, you are okay to go back to work tomorrow but wear a mask.    Drink plenty of water and get plenty of rest.  Sleep somewhat elevated and upright.  Sleep with humidifier.  Steam showers for cough and congestion.  Tylenol every 4 hours and Motrin every 6 hours.    Go to emergency room if you have any chest pain, dizziness, lightheadedness, palpitations, shortness of breath.

## 2025-02-27 ENCOUNTER — MED REC SCAN ONLY (OUTPATIENT)
Dept: FAMILY MEDICINE CLINIC | Facility: CLINIC | Age: 50
End: 2025-02-27

## 2025-08-23 ENCOUNTER — HOSPITAL ENCOUNTER (OUTPATIENT)
Age: 50
Discharge: HOME | End: 2025-08-23

## 2025-08-23 VITALS
DIASTOLIC BLOOD PRESSURE: 80 MMHG | TEMPERATURE: 99 F | HEART RATE: 95 BPM | OXYGEN SATURATION: 100 % | RESPIRATION RATE: 18 BRPM | SYSTOLIC BLOOD PRESSURE: 130 MMHG

## 2025-08-23 DIAGNOSIS — B34.9 VIRAL ILLNESS: Primary | ICD-10-CM

## 2025-08-23 LAB
S PYO AG THROAT QL: NEGATIVE
SARS-COV-2 RNA RESP QL NAA+PROBE: NOT DETECTED

## 2025-08-23 RX ORDER — FLUTICASONE PROPIONATE 50 MCG
2 SPRAY, SUSPENSION (ML) NASAL DAILY
Qty: 16 G | Refills: 0 | Status: SHIPPED | OUTPATIENT
Start: 2025-08-23

## 2025-08-23 RX ORDER — ACETAMINOPHEN 500 MG
1000 TABLET ORAL ONCE
Status: COMPLETED | OUTPATIENT
Start: 2025-08-23 | End: 2025-08-23

## 2025-08-23 RX ORDER — BENZOCAINE AND MENTHOL, UNSPECIFIED FORM 15; 2.3 MG/1; MG/1
1 LOZENGE ORAL 3 TIMES DAILY PRN
Qty: 16 LOZENGE | Refills: 0 | Status: SHIPPED | OUTPATIENT
Start: 2025-08-23

## 2025-08-23 RX ORDER — BENZONATATE 200 MG/1
200 CAPSULE ORAL 3 TIMES DAILY PRN
Qty: 21 CAPSULE | Refills: 0 | Status: SHIPPED | OUTPATIENT
Start: 2025-08-23

## (undated) DEVICE — VISUALIZATION SYSTEM: Brand: CLEARIFY

## (undated) DEVICE — GOWN SURG AERO BLUE PERF LG

## (undated) DEVICE — ARM DRAPE

## (undated) DEVICE — DRAPE C-ARM UNIVERSAL

## (undated) DEVICE — MONOPOLAR CURVED SCISSORS: Brand: ENDOWRIST

## (undated) DEVICE — TROCAR: Brand: KII FIOS FIRST ENTRY

## (undated) DEVICE — SUTURE VICRYL 0 UR-6

## (undated) DEVICE — PLUMEPORT ACTIV LAPAROSCOPIC SMOKE FILTRATION DEVICE: Brand: PLUMEPORT ACTIVE

## (undated) DEVICE — COLUMN DRAPE

## (undated) DEVICE — 3M™ STERI-STRIP™ COMPOUND BENZOIN TINCTURE 40 BAGS/CARTON 4 CARTONS/CASE C1544: Brand: 3M™ STERI-STRIP™

## (undated) DEVICE — Device

## (undated) DEVICE — STERILE SURGICAL LUBRICANT, METAL TUBE: Brand: SURGILUBE

## (undated) DEVICE — 40580 - THE PINK PAD - ADVANCED TRENDELENBURG POSITIONING KIT: Brand: 40580 - THE PINK PAD - ADVANCED TRENDELENBURG POSITIONING KIT

## (undated) DEVICE — 15MM BATTERY POWERED MORCELLATOR SYSTEM WITH OBTURATOR: Brand: LINA XCISE™, LAPAROSCOPIC MORCELLATOR

## (undated) DEVICE — TIP COVER ACCESSORY

## (undated) DEVICE — BLADELESS OBTURATOR: Brand: WECK VISTA

## (undated) DEVICE — SUTURE VLOC 180 0 12\" 0316

## (undated) DEVICE — UNDYED BRAIDED (POLYGLACTIN 910), SYNTHETIC ABSORBABLE SUTURE: Brand: COATED VICRYL

## (undated) DEVICE — VCARE MEDIUM, UTERINE MANIPULATOR, VAGINAL-CERVICAL-AHLUWALIA'S-RETRACTOR-ELEVATOR: Brand: VCARE

## (undated) DEVICE — SUCTION CANISTER, 3000CC,SAFELINER: Brand: DEROYAL

## (undated) DEVICE — MEGA NEEDLE DRIVER: Brand: ENDOWRIST

## (undated) DEVICE — SOL  .9 3000ML

## (undated) DEVICE — ROBOTIC: Brand: MEDLINE INDUSTRIES, INC.

## (undated) DEVICE — MEGA SUTURECUT ND: Brand: ENDOWRIST

## (undated) DEVICE — TRAY SKIN PREP PVP-1

## (undated) DEVICE — FENESTRATED BIPOLAR FORCEPS: Brand: ENDOWRIST

## (undated) DEVICE — CANNULA SEAL

## (undated) DEVICE — ENCORE® LATEX MICRO SIZE 7.5, STERILE LATEX POWDER-FREE SURGICAL GLOVE: Brand: ENCORE

## (undated) DEVICE — DRAPE SHEET LAVH 124X112X30

## (undated) DEVICE — SOL  .9 1000ML BTL

## (undated) DEVICE — INSUFFLATION NEEDLE TO ESTABLISH PNEUMOPERITONEUM.: Brand: INSUFFLATION NEEDLE

## (undated) NOTE — LETTER
August 28, 2018     Ann Stone  [address] [id number]      Dear Jayla Velasquez:    Below are the results from your recent visit: the following results are within normal limits:  ecg.     Resulted Orders   EKG 12-LEAD    Narrative    ECG Repo

## (undated) NOTE — LETTER
Date & Time: 4/9/2024, 8:41 AM  Patient: Luna Hamm  Encounter Provider(s):    Emeli Martinez PA-C       To Whom It May Concern:    Luna Hamm was seen and treated in our department on 4/9/2024.     If you have any questions or concerns, please do not hesitate to call.        _____________________________  Physician/APC Signature

## (undated) NOTE — LETTER
03/19/21        Reggie Levin  [address] [facility]      Dear Ryan Bedoya,    [facility] records indicate that you have outstanding lab work and or testing that was ordered for you and has not yet been completed:  Orders Placed This Encounter

## (undated) NOTE — LETTER
August 29, 2018     [address] [id number]      Dear Carlos Nap:    Below are the results from your recent visit: The following results are within normal limits    Resulted Orders   COMP METABOLIC PANEL (14)   Result Value Re Eosinophil % 7 %    Basophil % 1 %    Neutrophil Absolute 2.3 1.8 - 7.7 K/UL    Lymphocyte Absolute 2.0 1.0 - 4.0 K/UL    Monocyte Absolute 0.5 0.0 - 1.0 K/UL    Eosinophil Absolute 0.4 0.0 - 0.7 K/UL    Basophil Absolute 0.1 0.0 - 0.2 K/UL    Anisocytosi If you have any questions or concerns, please don't hesitate to call.

## (undated) NOTE — LETTER
05/29/19        Storm Certain  [address] [facility]      Dear Zak Tarrs,    [facility] records indicate that you have outstanding lab work and or testing that was ordered for you and has not yet been completed:  Orders Placed This Encounter

## (undated) NOTE — LETTER
AUTHORIZATION FOR SURGICAL OPERATION OR OTHER PROCEDURE    1.  I hereby authorize Dr. Bebeto Yañez, and [facility] staff assigned to my case to perform the following operation and/or procedure at the [facility]:    _______________________ Patient Name:  ______________________________________________________  (please print)      Patient signature:  ___________________________________________________             Relationship to Patient:           []  Parent    Responsible person

## (undated) NOTE — LETTER
9/18/2018              Petros Silva        [facility]         Dear Kim Cerda,    It was a pleasure to see you. Your mammogram was normal.  There is no need for further testing at this time.   I look forward to seeing you a

## (undated) NOTE — LETTER
9/12/2019          To Whom It May Concern:    Alisa Joaquin is currently under my medical care and may return to work on Monday September 16, 2019. She may return to work without restrictions.     If you require additional information please contact our

## (undated) NOTE — LETTER
Date & Time: 1/26/2025, 8:58 AM  Patient: Luna Hamm  Encounter Provider(s):    Maria Luz Grijalva APRN       To Whom It May Concern:    Luna Hamm was seen and treated in our department on 1/26/2025. She should not return to work until Wednesday January 29th 2025  .    If you have any questions or concerns, please do not hesitate to call.        _____________________________  Physician/APC Signature

## (undated) NOTE — LETTER
Date & Time: 10/8/2022, 8:26 AM  Patient: Tessy Vaughan  Encounter Provider(s):    GENESIS Luis       To Whom It May Concern:    Tessy Vaughan was seen and treated in our department on 10/8/2022. She should not return to work until 10/11/22 - must wear mask the remainder of the week.     If you have any questions or concerns, please do not hesitate to call.        _____________________________  Physician/APC Signature

## (undated) NOTE — LETTER
1/15/2020              [id number]         To whom it may concern,      Our patient Gonsalo Cardona has been treated for a medical condition and has been approved to return back to work without restriction

## (undated) NOTE — LETTER
5/13/2019              Ina Riuz         Dear Page Tolentino,    It was a pleasure to see you. Your PAP test was normal, with normal HPV cotesting. There is no need for further testing at this time.   I lo

## (undated) NOTE — LETTER
Date & Time: 12/5/2023, 8:50 AM  Patient: Natalie Mcgregor  Encounter Provider(s):    GENESIS Gonzalez       To Whom It May Concern:    Natalie Mcgregor was seen and treated in our department on 12/5/2023. She can return to work on 12/7/2023. If you have any questions or concerns, please do not hesitate to call.        _____________________________  Lisandra Joy.  Elin Dandy

## (undated) NOTE — LETTER
12/16/20        [facility]  [address] [facility]      Dear Lashaun Jung,    [address] records indicate that you have outstanding lab work and or testing that was ordered for you and has not yet been completed:  Orders Placed This Encounter

## (undated) NOTE — LETTER
12/31/2019          To Whom It May Concern:    Adele Ho is currently under my medical care and surgery was preformed on November 14,2019. Gonsalo Cardona would benefit of being off of work for 2 more weeks.     If you require additional information please

## (undated) NOTE — LETTER
[address]  [facility]  Dept: [phone]  Dept Fax: [phone]  Loc: [phone]      October 4, 2017    Patient: Kami Pulido   Date of Visit: 10/4/2017       To Whom It May Concern:    Panda